# Patient Record
Sex: MALE | Race: WHITE | Employment: OTHER | URBAN - METROPOLITAN AREA
[De-identification: names, ages, dates, MRNs, and addresses within clinical notes are randomized per-mention and may not be internally consistent; named-entity substitution may affect disease eponyms.]

---

## 2018-11-05 ENCOUNTER — HOSPITAL ENCOUNTER (OUTPATIENT)
Dept: CT IMAGING | Age: 63
Discharge: HOME | End: 2018-11-05
Attending: ORTHOPAEDIC SURGERY
Payer: COMMERCIAL

## 2018-11-05 DIAGNOSIS — M16.11 PRIMARY OSTEOARTHRITIS OF RIGHT HIP: ICD-10-CM

## 2018-11-05 PROCEDURE — 73700 CT LOWER EXTREMITY W/O DYE: CPT

## 2018-11-21 ENCOUNTER — HOSPITAL ENCOUNTER (OUTPATIENT)
Dept: PREADMISSION TESTING | Age: 63
Discharge: HOME | End: 2018-11-21
Attending: ORTHOPAEDIC SURGERY
Payer: COMMERCIAL

## 2018-11-21 VITALS
OXYGEN SATURATION: 95 % | DIASTOLIC BLOOD PRESSURE: 78 MMHG | SYSTOLIC BLOOD PRESSURE: 134 MMHG | HEART RATE: 72 BPM | BODY MASS INDEX: 32.16 KG/M2 | RESPIRATION RATE: 16 BRPM | HEIGHT: 70 IN | WEIGHT: 224.65 LBS | TEMPERATURE: 98.2 F

## 2018-11-21 LAB
ABO + RH BLD: NORMAL
ALBUMIN SERPL-MCNC: 3.8 G/DL (ref 3.5–5)
ALBUMIN/GLOB SERPL: 1.2 {RATIO} (ref 1.1–2.2)
ALP SERPL-CCNC: 83 U/L (ref 45–117)
ALT SERPL-CCNC: 51 U/L (ref 12–78)
ANION GAP SERPL CALC-SCNC: 9 MMOL/L (ref 5–15)
APPEARANCE UR: CLEAR
AST SERPL-CCNC: 33 U/L (ref 15–37)
ATRIAL RATE: 67 BPM
BACTERIA URNS QL MICRO: NEGATIVE /HPF
BASOPHILS # BLD: 0.1 K/UL (ref 0–0.1)
BASOPHILS NFR BLD: 1 % (ref 0–1)
BILIRUB SERPL-MCNC: 0.6 MG/DL (ref 0.2–1)
BILIRUB UR QL CFM: NEGATIVE
BLOOD GROUP ANTIBODIES SERPL: NORMAL
BUN SERPL-MCNC: 13 MG/DL (ref 6–20)
BUN/CREAT SERPL: 10 (ref 12–20)
CALCIUM SERPL-MCNC: 8.4 MG/DL (ref 8.5–10.1)
CALCULATED P AXIS, ECG09: 66 DEGREES
CALCULATED R AXIS, ECG10: -22 DEGREES
CALCULATED T AXIS, ECG11: 22 DEGREES
CHLORIDE SERPL-SCNC: 104 MMOL/L (ref 97–108)
CO2 SERPL-SCNC: 29 MMOL/L (ref 21–32)
COLOR UR: NORMAL
CREAT SERPL-MCNC: 1.29 MG/DL (ref 0.7–1.3)
CRP SERPL-MCNC: <0.29 MG/DL
DIAGNOSIS, 93000: NORMAL
DIFFERENTIAL METHOD BLD: ABNORMAL
EOSINOPHIL # BLD: 0.1 K/UL (ref 0–0.4)
EOSINOPHIL NFR BLD: 1 % (ref 0–7)
EPITH CASTS URNS QL MICRO: NORMAL /LPF
ERYTHROCYTE [DISTWIDTH] IN BLOOD BY AUTOMATED COUNT: 12.9 % (ref 11.5–14.5)
ERYTHROCYTE [SEDIMENTATION RATE] IN BLOOD: 2 MM/HR (ref 0–20)
EST. AVERAGE GLUCOSE BLD GHB EST-MCNC: 114 MG/DL
GLOBULIN SER CALC-MCNC: 3.3 G/DL (ref 2–4)
GLUCOSE SERPL-MCNC: 92 MG/DL (ref 65–100)
GLUCOSE UR STRIP.AUTO-MCNC: NEGATIVE MG/DL
HBA1C MFR BLD: 5.6 % (ref 4.2–6.3)
HCT VFR BLD AUTO: 49 % (ref 36.6–50.3)
HGB BLD-MCNC: 16.3 G/DL (ref 12.1–17)
HGB UR QL STRIP: NEGATIVE
HYALINE CASTS URNS QL MICRO: NORMAL /LPF (ref 0–5)
IMM GRANULOCYTES # BLD: 0 K/UL (ref 0–0.04)
IMM GRANULOCYTES NFR BLD AUTO: 0 % (ref 0–0.5)
KETONES UR QL STRIP.AUTO: NEGATIVE MG/DL
LEUKOCYTE ESTERASE UR QL STRIP.AUTO: NEGATIVE
LYMPHOCYTES # BLD: 1.7 K/UL (ref 0.8–3.5)
LYMPHOCYTES NFR BLD: 32 % (ref 12–49)
MCH RBC QN AUTO: 32.7 PG (ref 26–34)
MCHC RBC AUTO-ENTMCNC: 33.3 G/DL (ref 30–36.5)
MCV RBC AUTO: 98.2 FL (ref 80–99)
MONOCYTES # BLD: 0.8 K/UL (ref 0–1)
MONOCYTES NFR BLD: 14 % (ref 5–13)
NEUTS SEG # BLD: 2.8 K/UL (ref 1.8–8)
NEUTS SEG NFR BLD: 52 % (ref 32–75)
NITRITE UR QL STRIP.AUTO: NEGATIVE
NRBC # BLD: 0 K/UL (ref 0–0.01)
NRBC BLD-RTO: 0 PER 100 WBC
P-R INTERVAL, ECG05: 126 MS
PH UR STRIP: 6.5 [PH] (ref 5–8)
PLATELET # BLD AUTO: 234 K/UL (ref 150–400)
PMV BLD AUTO: 9.4 FL (ref 8.9–12.9)
POTASSIUM SERPL-SCNC: 4.7 MMOL/L (ref 3.5–5.1)
PROT SERPL-MCNC: 7.1 G/DL (ref 6.4–8.2)
PROT UR STRIP-MCNC: NEGATIVE MG/DL
Q-T INTERVAL, ECG07: 390 MS
QRS DURATION, ECG06: 100 MS
QTC CALCULATION (BEZET), ECG08: 412 MS
RBC # BLD AUTO: 4.99 M/UL (ref 4.1–5.7)
RBC #/AREA URNS HPF: NORMAL /HPF (ref 0–5)
SODIUM SERPL-SCNC: 142 MMOL/L (ref 136–145)
SP GR UR REFRACTOMETRY: 1.02 (ref 1–1.03)
SPECIMEN EXP DATE BLD: NORMAL
UA: UC IF INDICATED,UAUC: NORMAL
UROBILINOGEN UR QL STRIP.AUTO: 1 EU/DL (ref 0.2–1)
VENTRICULAR RATE, ECG03: 67 BPM
WBC # BLD AUTO: 5.4 K/UL (ref 4.1–11.1)
WBC URNS QL MICRO: NORMAL /HPF (ref 0–4)

## 2018-11-21 PROCEDURE — 81001 URINALYSIS AUTO W/SCOPE: CPT

## 2018-11-21 PROCEDURE — 83036 HEMOGLOBIN GLYCOSYLATED A1C: CPT

## 2018-11-21 PROCEDURE — 93005 ELECTROCARDIOGRAM TRACING: CPT

## 2018-11-21 PROCEDURE — 36415 COLL VENOUS BLD VENIPUNCTURE: CPT

## 2018-11-21 PROCEDURE — 84466 ASSAY OF TRANSFERRIN: CPT

## 2018-11-21 PROCEDURE — 86140 C-REACTIVE PROTEIN: CPT

## 2018-11-21 PROCEDURE — 80053 COMPREHEN METABOLIC PANEL: CPT

## 2018-11-21 PROCEDURE — 86901 BLOOD TYPING SEROLOGIC RH(D): CPT

## 2018-11-21 PROCEDURE — 85652 RBC SED RATE AUTOMATED: CPT

## 2018-11-21 PROCEDURE — 85025 COMPLETE CBC W/AUTO DIFF WBC: CPT

## 2018-11-21 RX ORDER — FOLIC ACID 1 MG/1
TABLET ORAL DAILY
COMMUNITY

## 2018-11-21 RX ORDER — ETODOLAC 500 MG/1
500 TABLET, FILM COATED ORAL 2 TIMES DAILY
COMMUNITY

## 2018-11-21 RX ORDER — AMLODIPINE BESYLATE 5 MG/1
5 TABLET ORAL DAILY
COMMUNITY

## 2018-11-21 RX ORDER — TESTOSTERONE CYPIONATE 200 MG/ML
INJECTION INTRAMUSCULAR ONCE
COMMUNITY

## 2018-11-21 RX ORDER — PRAVASTATIN SODIUM 10 MG/1
TABLET ORAL
Status: ON HOLD | COMMUNITY
End: 2018-11-28

## 2018-11-21 RX ORDER — LISINOPRIL 5 MG/1
TABLET ORAL DAILY
COMMUNITY

## 2018-11-21 NOTE — FACE TO FACE
The patient attended the pre-operative joint replacement class. The content of the class was presented using a power point presentation and visual demonstrations specific for patients undergoing total hip and knee replacement surgery. A pre-operative education booklet was given to the patient. During class opportunities were given for questions to be asked and concerns voiced regarding the patients upcoming surgery. Patient and wife attended class on 11-21-18

## 2018-11-21 NOTE — PERIOP NOTES
[address] Pre-Admission Testing [phone] Pre-Operative Instructions Your procedure is scheduled for Wednesday, 11/28/18. We will call you the afternoon before surgery with your arrival time. If you have not received your arrival time by 7p.m., you may call us at [phone]. 
 
? Please report to the 2nd Floor Admitting Desk on the day of your surgery. Bring your insurance card, photo identification, and applicable copayment. ? If you are being admitted to the hospital, please leave your overnight bag in your car until you are taken to your hospital room. On the day you go home, expect to be discharged by noon. Please have your  arrive prior to noon so you are not delayed. ? You may not have anything to eat or drink after midnight the night before surgery. ? With as little water as possible, take these medications the morning of surgery:  amlodipine ? Do not drink alcoholic beverages 24 hours before and after your surgery. ? Do not take methotrexate until after surgery. Please follow surgeon's instructions regarding biologic. ? Do not take aspirin, etodolac, methotrexate, or any other non-steroidal anti-inflammatory drug beginning today, 11/21/18, until after your surgery. This includes Ibuprofen, Motrin, Advil, Naproxen, & Aleve. You may resume post-operatively as directed by your surgeon. ? You may take Tylenol 650mg for pain or discomfort. ? If you shower the morning of surgery, please do not apply lotion, powder, deodorant. Do not shave any part of your body within 24 hours of surgery. ? Please leave all money, jewelry (including piercings) and valuables at home. ? Wear comfortable clothes. ? Should you become ill in the days before your surgery, even with a simple cold, please notify your surgeons office immediately. ? Please follow all instructions to avoid any potential surgical cancellation. ? If a situation occurs where you may be delayed the day of your surgery, please call us at [phone]. Special Instructions: 
Use Chlorhexidine Care Fusion wash and sponges 3 days prior to surgery as instructed. Incentive spirometer given with instructions to practice at home and bring back to the hospital on the day of surgery. [facility] will contact you if your Hemoglobin A1C is greater than 7.5. Ensure/Glucerna  sample, nutritional information, and Ensure/Glucerna coupon given. Pain pamphlet and Call Don't Fall reminder reviewed with patient.  parking is complimentary Monday - Friday 7 am - 5 pm 
Bring PTA Medication list day of surgery with the last doses taken documented ? The patient was contacted in person. He verbalizes understanding of all instructions does not need reinforcement.

## 2018-11-21 NOTE — H&P
PAT Pre-Op History & Physical 
 
Patient: Liz Anderson                  MRN: [id number]          SSN: [id number] YOB: 1955          Age: 66 y.o. Sex: male Subjective:  
Patient is a 66 y.o.  male who presents with history of chronic right hip pain that has been a problem for 10-12 years per patient report. States that the pain has escalated over time. Rates his right hip pain 2/10- 8/10 and describes it as a constant burning pain that can be sharp at times. States that the pain is in his right groin and radiates into the outer aspect of his hip. Walking exacerbates his hip pain and therefore he can not be as active as he would like. Ct of right LE done 11/05/2018 showed: 
 
IMPRESSION: Severe right and mild left hip osteoarthrosis. Mild bilateral SI joint osteoarthrosis, greater on the right. Lower lumbar spine degenerative 
changes with moderate L4-5 canal stenosis. Has failed steroid joint injections, NSAIDs, and PT. The patient was evaluated in the surgeon's office and it was determined that the most appropriate plan of care is to proceed with surgical intervention. [facility]'s PCP [facility] 
 
 
 
 
 
Past Medical History:  
Diagnosis Date  Arthritis   
 ankylosing spondylitis  Asthma   
 childhood- no problem after age 17  Cancer ([address] Skin cancer x3 on head (BCC and SCC)  Chronic pain   
 right hip, sciatica  Hyperlipidemia  Hypertension  Obesity (BMI 30.0-34.9) 11/21/2018 BMI= 32.2  Pericarditis, acute 1976  Estella's disease ([address] Past Surgical History:  
Procedure Laterality Date  HX ORTHOPAEDIC Right 2003  
 bone fusion thumb  HX OTHER SURGICAL    
 3 surgeries to remove skin cancer (Mohs) Prior to Admission medications Medication Sig Start Date End Date Taking? Authorizing Provider  
etodolac (LODINE) 500 mg tablet Take 500 mg by mouth two (2) times a day.    Yes Provider, Historical  
 lisinopril (PRINIVIL, ZESTRIL) 5 mg tablet Take  by mouth daily. Yes Provider, Historical  
amLODIPine (NORVASC) 5 mg tablet Take 5 mg by mouth daily. Yes Provider, Historical  
pravastatin (PRAVACHOL) 10 mg tablet Take  by mouth nightly. Yes Provider, Historical  
folic acid (FOLVITE) 1 mg tablet Take  by mouth daily. Yes Provider, Historical  
methotrexate (RHEUMATREX) 2.5 mg tablet dose pack Take 15 mg by mouth every . Yes Provider, Historical  
infliximab (REMICADE IV) 476 mg by IntraVENous route every six (6) weeks. Yes Provider, Historical  
testosterone cypionate (DEPO-TESTOSTERONE) 200 mg/mL injection by IntraMUSCular route once. Pt states he gets injection once every 10wks   Yes Provider, Historical  
 
Current Outpatient Medications Medication Sig  etodolac (LODINE) 500 mg tablet Take 500 mg by mouth two (2) times a day.  lisinopril (PRINIVIL, ZESTRIL) 5 mg tablet Take  by mouth daily.  amLODIPine (NORVASC) 5 mg tablet Take 5 mg by mouth daily.  pravastatin (PRAVACHOL) 10 mg tablet Take  by mouth nightly.  folic acid (FOLVITE) 1 mg tablet Take  by mouth daily.  methotrexate (RHEUMATREX) 2.5 mg tablet dose pack Take 15 mg by mouth every .  infliximab (REMICADE IV) 476 mg by IntraVENous route every six (6) weeks.  testosterone cypionate (DEPO-TESTOSTERONE) 200 mg/mL injection by IntraMUSCular route once. Pt states he gets injection once every 10wks No current facility-administered medications for this encounter. Allergies Allergen Reactions  Pcn [Penicillins] Hives  Tramadol Hives Social History Tobacco Use  Smoking status: Former Smoker Packs/day: 0.75 Years: 18.00 Pack years: 13.50 Last attempt to quit: 1993 Years since quittin.0  Smokeless tobacco: Never Used Substance Use Topics  Alcohol use: Yes Alcohol/week: 1.2 oz Types: 2 Shots of liquor per week Social History Substance and Sexual Activity Drug Use No  
 
Family History Family history unknown: Yes Review of Systems Patient denies difficulty swallowing, mouth sores, or loose teeth. Patient denies any recent dental procedures or any planned prior to surgery. Patient denies chest pain, tightness, pain radiating down left arm, palpitations. Denies dizziness, visual disturbances, or lightheadedness. Patient denies shortness of breath, wheezing, cough, fever, or chills. Patient denies diarrhea, constipation, or abdominal pain. States he normally has BM every 5 days. Patient denies urinary problems including dysuria, hesitancy, urgency, or incontinence. Denies skin breakdown, rashes, insect bites or open area. C/o right hip pain. Objective:  
 
Patient Vitals for the past 24 hrs: 
 Temp Pulse Resp BP SpO2  
18 0905 98.2 °F (36.8 °C) 72 16 134/78 95 % Temp (24hrs), Av.2 °F (36.8 °C), Min:98.2 °F (36.8 °C), Max:98.2 °F (36.8 °C) Body mass index is 32.23 kg/m². Wt Readings from Last 1 Encounters:  
18 101.9 kg (224 lb 10.4 oz) Physical Exam: 
 
 General: Pleasant,  cooperative, no apparent distress, appears stated age. Ambulates with a cane. Eyes: Conjunctivae/corneas clear. EOMs intact. Nose: Nares normal. 
 Mouth/Throat: Lips, mucosa, and tongue normal. Teeth and gums normal. 
 Neck: Supple, symmetrical, trachea midline. Back: Symmetric Lungs: Clear to auscultation bilaterally. Heart: Regular rate and rhythm, S1, S2 normal. No murmur, click, rub or gallop. Abdomen: Soft, non-tender. Bowel sounds normal. No distention. Musculoskeletal:  Gait is antalgic. Extremities:  Extremities normal, atraumatic, no cyanosis or edema. Calves 
                               supple, non tender to palpation. Pulses: 2+ and symmetric bilateral upper extremities. Cap. refill <2 seconds Skin: Skin color, texture, turgor normal.  No visible rashes or lesions. Neurologic: CN II-XII grossly intact. Alert and oriented x3. Labs:  
Recent Results (from the past 72 hour(s)) TYPE & SCREEN Collection Time: 11/21/18  9:47 AM  
Result Value Ref Range Crossmatch Expiration 12/01/2018 ABO/Rh(D) A POSITIVE Antibody screen NEG   
C REACTIVE PROTEIN, QT Collection Time: 11/21/18  9:47 AM  
Result Value Ref Range C-Reactive protein <0.29 <0.60 mg/dL CBC WITH AUTOMATED DIFF Collection Time: 11/21/18  9:47 AM  
Result Value Ref Range WBC 5.4 4.1 - 11.1 K/uL  
 RBC 4.99 4. 10 - 5.70 M/uL  
 HGB 16.3 12.1 - 17.0 g/dL HCT 49.0 36.6 - 50.3 % MCV 98.2 80.0 - 99.0 FL  
 MCH 32.7 26.0 - 34.0 PG  
 MCHC 33.3 30.0 - 36.5 g/dL  
 RDW 12.9 11.5 - 14.5 % PLATELET 197 532 - 355 K/uL MPV 9.4 8.9 - 12.9 FL  
 NRBC 0.0 0  WBC ABSOLUTE NRBC 0.00 0.00 - 0.01 K/uL NEUTROPHILS 52 32 - 75 % LYMPHOCYTES 32 12 - 49 % MONOCYTES 14 (H) 5 - 13 % EOSINOPHILS 1 0 - 7 % BASOPHILS 1 0 - 1 % IMMATURE GRANULOCYTES 0 0.0 - 0.5 % ABS. NEUTROPHILS 2.8 1.8 - 8.0 K/UL  
 ABS. LYMPHOCYTES 1.7 0.8 - 3.5 K/UL  
 ABS. MONOCYTES 0.8 0.0 - 1.0 K/UL  
 ABS. EOSINOPHILS 0.1 0.0 - 0.4 K/UL  
 ABS. BASOPHILS 0.1 0.0 - 0.1 K/UL  
 ABS. IMM. GRANS. 0.0 0.00 - 0.04 K/UL  
 DF AUTOMATED METABOLIC PANEL, COMPREHENSIVE Collection Time: 11/21/18  9:47 AM  
Result Value Ref Range Sodium 142 136 - 145 mmol/L Potassium 4.7 3.5 - 5.1 mmol/L Chloride 104 97 - 108 mmol/L  
 CO2 29 21 - 32 mmol/L Anion gap 9 5 - 15 mmol/L Glucose 92 65 - 100 mg/dL BUN 13 6 - 20 MG/DL Creatinine 1.29 0.70 - 1.30 MG/DL  
 BUN/Creatinine ratio 10 (L) 12 - 20 GFR est AA >60 >60 ml/min/1.73m2 GFR est non-AA 57 (L) >60 ml/min/1.73m2 Calcium 8.4 (L) 8.5 - 10.1 MG/DL Bilirubin, total 0.6 0.2 - 1.0 MG/DL  
 ALT (SGPT) 51 12 - 78 U/L  
 AST (SGOT) 33 15 - 37 U/L Alk. phosphatase 83 45 - 117 U/L Protein, total 7.1 6.4 - 8.2 g/dL Albumin 3.8 3.5 - 5.0 g/dL Globulin 3.3 2.0 - 4.0 g/dL A-G Ratio 1.2 1.1 - 2.2 HEMOGLOBIN A1C WITH EAG Collection Time: 11/21/18  9:47 AM  
Result Value Ref Range Hemoglobin A1c 5.6 4.2 - 6.3 % Est. average glucose 114 mg/dL SED RATE (ESR) Collection Time: 11/21/18  9:47 AM  
Result Value Ref Range Sed rate, automated 2 0 - 20 mm/hr URINALYSIS W/ REFLEX CULTURE Collection Time: 11/21/18  9:47 AM  
Result Value Ref Range Color YELLOW/STRAW Appearance CLEAR CLEAR Specific gravity 1.022 1.003 - 1.030    
 pH (UA) 6.5 5.0 - 8.0 Protein NEGATIVE  NEG mg/dL Glucose NEGATIVE  NEG mg/dL Ketone NEGATIVE  NEG mg/dL Blood NEGATIVE  NEG Urobilinogen 1.0 0.2 - 1.0 EU/dL Nitrites NEGATIVE  NEG Leukocyte Esterase NEGATIVE  NEG    
 WBC 0-4 0 - 4 /hpf  
 RBC 10-20 0 - 5 /hpf Epithelial cells FEW FEW /lpf Bacteria NEGATIVE  NEG /hpf  
 UA:UC IF INDICATED CULTURE NOT INDICATED BY UA RESULT CNI Hyaline cast 0-2 0 - 5 /lpf  
BILIRUBIN, CONFIRM Collection Time: 11/21/18  9:47 AM  
Result Value Ref Range Bilirubin UA, confirm NEGATIVE  NEG    
EKG, 12 LEAD, INITIAL Collection Time: 11/21/18 12:23 PM  
Result Value Ref Range Ventricular Rate 67 BPM  
 Atrial Rate 67 BPM  
 P-R Interval 126 ms  
 QRS Duration 100 ms Q-T Interval 390 ms QTC Calculation (Bezet) 412 ms Calculated P Axis 66 degrees Calculated R Axis -22 degrees Calculated T Axis 22 degrees Diagnosis Normal sinus rhythm Normal ECG No previous ECGs available Assessment:  
 
Primary localized osteoarthritis of right hip. Plan:  
 
Scheduled for right total hip arthroplasty- direct anterior. Labs and EKG done per surgeon's orders. Labs and EKG reviewed- unremarkable. MRSA and transferrin pending. Attended joint class 11/21/2018. Requested last office note from rheumatology- [address] [place] [address].  
 
 
 
Pepper Garcia NP

## 2018-11-21 NOTE — PERIOP NOTES
Called Dr. Adrian Gutierrez office requesting last office note, labs, & date of last remicade injection.

## 2018-11-22 LAB
BACTERIA SPEC CULT: NORMAL
BACTERIA SPEC CULT: NORMAL
SERVICE CMNT-IMP: NORMAL
TRANSFERRIN SERPL-MCNC: 296 MG/DL (ref 200–370)

## 2018-11-27 ENCOUNTER — ANESTHESIA EVENT (OUTPATIENT)
Dept: SURGERY | Age: 63
DRG: 470 | End: 2018-11-27
Payer: COMMERCIAL

## 2018-11-28 ENCOUNTER — ANESTHESIA (OUTPATIENT)
Dept: SURGERY | Age: 63
DRG: 470 | End: 2018-11-28
Payer: COMMERCIAL

## 2018-11-28 ENCOUNTER — APPOINTMENT (OUTPATIENT)
Dept: GENERAL RADIOLOGY | Age: 63
DRG: 470 | End: 2018-11-28
Attending: PHYSICIAN ASSISTANT
Payer: COMMERCIAL

## 2018-11-28 ENCOUNTER — HOSPITAL ENCOUNTER (INPATIENT)
Age: 63
LOS: 1 days | Discharge: HOME | DRG: 470 | End: 2018-11-29
Attending: ORTHOPAEDIC SURGERY | Admitting: ORTHOPAEDIC SURGERY
Payer: COMMERCIAL

## 2018-11-28 DIAGNOSIS — M16.11 PRIMARY OSTEOARTHRITIS OF RIGHT HIP: Primary | ICD-10-CM

## 2018-11-28 PROCEDURE — 77030013708 HC HNDPC SUC IRR PULS STRY –B: Performed by: ORTHOPAEDIC SURGERY

## 2018-11-28 PROCEDURE — 74011000250 HC RX REV CODE- 250

## 2018-11-28 PROCEDURE — 72170 X-RAY EXAM OF PELVIS: CPT

## 2018-11-28 PROCEDURE — C1713 ANCHOR/SCREW BN/BN,TIS/BN: HCPCS | Performed by: ORTHOPAEDIC SURGERY

## 2018-11-28 PROCEDURE — 97116 GAIT TRAINING THERAPY: CPT

## 2018-11-28 PROCEDURE — 0SR904A REPLACEMENT OF RIGHT HIP JOINT WITH CERAMIC ON POLYETHYLENE SYNTHETIC SUBSTITUTE, UNCEMENTED, OPEN APPROACH: ICD-10-PCS | Performed by: ORTHOPAEDIC SURGERY

## 2018-11-28 PROCEDURE — 77030002933 HC SUT MCRYL J&J -A: Performed by: ORTHOPAEDIC SURGERY

## 2018-11-28 PROCEDURE — 74011250637 HC RX REV CODE- 250/637: Performed by: ANESTHESIOLOGY

## 2018-11-28 PROCEDURE — 77030020788: Performed by: ORTHOPAEDIC SURGERY

## 2018-11-28 PROCEDURE — 77030039266 HC ADH SKN EXOFIN S2SG -A: Performed by: ORTHOPAEDIC SURGERY

## 2018-11-28 PROCEDURE — 77030006835 HC BLD SAW SAG STRY -B: Performed by: ORTHOPAEDIC SURGERY

## 2018-11-28 PROCEDURE — 77030032490 HC SLV COMPR SCD KNE COVD -B

## 2018-11-28 PROCEDURE — 74011250637 HC RX REV CODE- 250/637: Performed by: ORTHOPAEDIC SURGERY

## 2018-11-28 PROCEDURE — 76060000064 HC AMB SURG ANES 2 TO 2.5 HR: Performed by: ORTHOPAEDIC SURGERY

## 2018-11-28 PROCEDURE — 77030029821: Performed by: ORTHOPAEDIC SURGERY

## 2018-11-28 PROCEDURE — 77030020263 HC SOL INJ SOD CL0.9% LFCR 1000ML: Performed by: ORTHOPAEDIC SURGERY

## 2018-11-28 PROCEDURE — 77030035236 HC SUT PDS STRATFX BARB J&J -B: Performed by: ORTHOPAEDIC SURGERY

## 2018-11-28 PROCEDURE — 65270000029 HC RM PRIVATE

## 2018-11-28 PROCEDURE — 77030029829 HC TIB INST CKPNT DISP STRY -B: Performed by: ORTHOPAEDIC SURGERY

## 2018-11-28 PROCEDURE — 77030018673: Performed by: ORTHOPAEDIC SURGERY

## 2018-11-28 PROCEDURE — 74011000258 HC RX REV CODE- 258

## 2018-11-28 PROCEDURE — 74011250636 HC RX REV CODE- 250/636: Performed by: ANESTHESIOLOGY

## 2018-11-28 PROCEDURE — 76210000036 HC AMBSU PH I REC 1.5 TO 2 HR: Performed by: ORTHOPAEDIC SURGERY

## 2018-11-28 PROCEDURE — 74011250636 HC RX REV CODE- 250/636

## 2018-11-28 PROCEDURE — 77030031139 HC SUT VCRL2 J&J -A: Performed by: ORTHOPAEDIC SURGERY

## 2018-11-28 PROCEDURE — 77030018836 HC SOL IRR NACL ICUM -A: Performed by: ORTHOPAEDIC SURGERY

## 2018-11-28 PROCEDURE — 77030007866 HC KT SPN ANES BBMI -B

## 2018-11-28 PROCEDURE — 74011000272 HC RX REV CODE- 272: Performed by: ORTHOPAEDIC SURGERY

## 2018-11-28 PROCEDURE — 77030036660

## 2018-11-28 PROCEDURE — 76030000021 HC AMB SURG 2 TO 2.5 HR INTENSV-TIER 1: Performed by: ORTHOPAEDIC SURGERY

## 2018-11-28 PROCEDURE — 77030020782 HC GWN BAIR PAWS FLX 3M -B

## 2018-11-28 PROCEDURE — 77030012935 HC DRSG AQUACEL BMS -B: Performed by: ORTHOPAEDIC SURGERY

## 2018-11-28 PROCEDURE — 77030038587 HC LNR BOOT DISP ALLN -B: Performed by: ORTHOPAEDIC SURGERY

## 2018-11-28 PROCEDURE — C1776 JOINT DEVICE (IMPLANTABLE): HCPCS | Performed by: ORTHOPAEDIC SURGERY

## 2018-11-28 PROCEDURE — 74011000250 HC RX REV CODE- 250: Performed by: ORTHOPAEDIC SURGERY

## 2018-11-28 PROCEDURE — 77030011640 HC PAD GRND REM COVD -A: Performed by: ORTHOPAEDIC SURGERY

## 2018-11-28 PROCEDURE — 97161 PT EVAL LOW COMPLEX 20 MIN: CPT

## 2018-11-28 PROCEDURE — 74011250637 HC RX REV CODE- 250/637: Performed by: PHYSICIAN ASSISTANT

## 2018-11-28 PROCEDURE — 74011250636 HC RX REV CODE- 250/636: Performed by: PHYSICIAN ASSISTANT

## 2018-11-28 PROCEDURE — 74011250636 HC RX REV CODE- 250/636: Performed by: ORTHOPAEDIC SURGERY

## 2018-11-28 DEVICE — COMPONENT HIP PRSS FT MTL ON CERM POLYETH X3: Type: IMPLANTABLE DEVICE | Status: FUNCTIONAL

## 2018-11-28 DEVICE — CERAMIC V40 FEMORAL HEAD
Type: IMPLANTABLE DEVICE | Site: HIP | Status: FUNCTIONAL
Brand: BIOLOX

## 2018-11-28 DEVICE — CANCELLOUS BONE SCREW
Type: IMPLANTABLE DEVICE | Site: HIP | Status: FUNCTIONAL
Brand: TORX

## 2018-11-28 DEVICE — HEMISPHERICAL CLUSTER HOLE SHELL
Type: IMPLANTABLE DEVICE | Site: HIP | Status: FUNCTIONAL
Brand: TRITANIUM

## 2018-11-28 DEVICE — 0 DEGREE POLYETHYLENE INSERT
Type: IMPLANTABLE DEVICE | Site: HIP | Status: FUNCTIONAL
Brand: TRIDENT

## 2018-11-28 DEVICE — 127 DEGREE NECK ANGLE HIP STEM
Type: IMPLANTABLE DEVICE | Site: HIP | Status: FUNCTIONAL
Brand: ACCOLADE

## 2018-11-28 RX ORDER — ONDANSETRON 2 MG/ML
4 INJECTION INTRAMUSCULAR; INTRAVENOUS
Status: ACTIVE | OUTPATIENT
Start: 2018-11-28 | End: 2018-11-29

## 2018-11-28 RX ORDER — OXYCODONE HYDROCHLORIDE 5 MG/1
10 TABLET ORAL
Status: DISCONTINUED | OUTPATIENT
Start: 2018-11-28 | End: 2018-11-29 | Stop reason: HOSPADM

## 2018-11-28 RX ORDER — FENTANYL CITRATE 50 UG/ML
INJECTION, SOLUTION INTRAMUSCULAR; INTRAVENOUS AS NEEDED
Status: DISCONTINUED | OUTPATIENT
Start: 2018-11-28 | End: 2018-11-28 | Stop reason: HOSPADM

## 2018-11-28 RX ORDER — OXYCODONE HYDROCHLORIDE 5 MG/1
5 TABLET ORAL
Status: DISCONTINUED | OUTPATIENT
Start: 2018-11-28 | End: 2018-11-29 | Stop reason: HOSPADM

## 2018-11-28 RX ORDER — TRAMADOL HYDROCHLORIDE 50 MG/1
50 TABLET ORAL
Qty: 40 TAB | Refills: 0 | Status: SHIPPED | OUTPATIENT
Start: 2018-11-28

## 2018-11-28 RX ORDER — AMOXICILLIN 250 MG
1 CAPSULE ORAL 2 TIMES DAILY
Status: DISCONTINUED | OUTPATIENT
Start: 2018-11-28 | End: 2018-11-29 | Stop reason: HOSPADM

## 2018-11-28 RX ORDER — SODIUM CHLORIDE, SODIUM LACTATE, POTASSIUM CHLORIDE, CALCIUM CHLORIDE 600; 310; 30; 20 MG/100ML; MG/100ML; MG/100ML; MG/100ML
125 INJECTION, SOLUTION INTRAVENOUS CONTINUOUS
Status: DISCONTINUED | OUTPATIENT
Start: 2018-11-28 | End: 2018-11-28 | Stop reason: HOSPADM

## 2018-11-28 RX ORDER — NALOXONE HYDROCHLORIDE 0.4 MG/ML
0.4 INJECTION, SOLUTION INTRAMUSCULAR; INTRAVENOUS; SUBCUTANEOUS AS NEEDED
Status: DISCONTINUED | OUTPATIENT
Start: 2018-11-28 | End: 2018-11-29 | Stop reason: HOSPADM

## 2018-11-28 RX ORDER — FACIAL-BODY WIPES
10 EACH TOPICAL DAILY PRN
Status: DISCONTINUED | OUTPATIENT
Start: 2018-11-30 | End: 2018-11-29 | Stop reason: HOSPADM

## 2018-11-28 RX ORDER — LIDOCAINE HYDROCHLORIDE 20 MG/ML
INJECTION, SOLUTION INFILTRATION; PERINEURAL AS NEEDED
Status: DISCONTINUED | OUTPATIENT
Start: 2018-11-28 | End: 2018-11-28 | Stop reason: HOSPADM

## 2018-11-28 RX ORDER — LIDOCAINE HYDROCHLORIDE 10 MG/ML
0.1 INJECTION, SOLUTION EPIDURAL; INFILTRATION; INTRACAUDAL; PERINEURAL AS NEEDED
Status: DISCONTINUED | OUTPATIENT
Start: 2018-11-28 | End: 2018-11-28 | Stop reason: HOSPADM

## 2018-11-28 RX ORDER — SODIUM CHLORIDE 0.9 % (FLUSH) 0.9 %
5-10 SYRINGE (ML) INJECTION EVERY 8 HOURS
Status: DISCONTINUED | OUTPATIENT
Start: 2018-11-29 | End: 2018-11-29 | Stop reason: HOSPADM

## 2018-11-28 RX ORDER — DIPHENHYDRAMINE HYDROCHLORIDE 50 MG/ML
12.5 INJECTION, SOLUTION INTRAMUSCULAR; INTRAVENOUS
Status: ACTIVE | OUTPATIENT
Start: 2018-11-28 | End: 2018-11-29

## 2018-11-28 RX ORDER — OXYCODONE HYDROCHLORIDE 5 MG/1
5 TABLET ORAL
Qty: 30 TAB | Refills: 0 | Status: SHIPPED | OUTPATIENT
Start: 2018-11-28

## 2018-11-28 RX ORDER — ONDANSETRON 2 MG/ML
INJECTION INTRAMUSCULAR; INTRAVENOUS AS NEEDED
Status: DISCONTINUED | OUTPATIENT
Start: 2018-11-28 | End: 2018-11-28 | Stop reason: HOSPADM

## 2018-11-28 RX ORDER — POLYETHYLENE GLYCOL 3350 17 G/17G
17 POWDER, FOR SOLUTION ORAL DAILY
Status: DISCONTINUED | OUTPATIENT
Start: 2018-11-29 | End: 2018-11-29 | Stop reason: HOSPADM

## 2018-11-28 RX ORDER — ACETAMINOPHEN 325 MG/1
650 TABLET ORAL EVERY 6 HOURS
Status: DISCONTINUED | OUTPATIENT
Start: 2018-11-28 | End: 2018-11-29 | Stop reason: HOSPADM

## 2018-11-28 RX ORDER — FAMOTIDINE 20 MG/1
20 TABLET, FILM COATED ORAL 2 TIMES DAILY
Status: DISCONTINUED | OUTPATIENT
Start: 2018-11-28 | End: 2018-11-29 | Stop reason: HOSPADM

## 2018-11-28 RX ORDER — PREGABALIN 75 MG/1
75 CAPSULE ORAL ONCE
Status: COMPLETED | OUTPATIENT
Start: 2018-11-28 | End: 2018-11-28

## 2018-11-28 RX ORDER — AMLODIPINE BESYLATE 5 MG/1
5 TABLET ORAL DAILY
Status: DISCONTINUED | OUTPATIENT
Start: 2018-11-29 | End: 2018-11-29 | Stop reason: HOSPADM

## 2018-11-28 RX ORDER — SODIUM CHLORIDE 9 MG/ML
125 INJECTION, SOLUTION INTRAVENOUS CONTINUOUS
Status: DISCONTINUED | OUTPATIENT
Start: 2018-11-28 | End: 2018-11-29 | Stop reason: HOSPADM

## 2018-11-28 RX ORDER — PRAVASTATIN SODIUM 20 MG/1
40 TABLET ORAL
Status: DISCONTINUED | OUTPATIENT
Start: 2018-11-28 | End: 2018-11-29 | Stop reason: HOSPADM

## 2018-11-28 RX ORDER — ACETAMINOPHEN 500 MG
500-1000 TABLET ORAL
Qty: 60 TAB | Refills: 0 | Status: SHIPPED | OUTPATIENT
Start: 2018-11-28

## 2018-11-28 RX ORDER — ASPIRIN 81 MG/1
81 TABLET ORAL 2 TIMES DAILY
Status: DISCONTINUED | OUTPATIENT
Start: 2018-11-28 | End: 2018-11-29 | Stop reason: HOSPADM

## 2018-11-28 RX ORDER — FENTANYL CITRATE 50 UG/ML
25 INJECTION, SOLUTION INTRAMUSCULAR; INTRAVENOUS
Status: DISCONTINUED | OUTPATIENT
Start: 2018-11-28 | End: 2018-11-28 | Stop reason: HOSPADM

## 2018-11-28 RX ORDER — CEFAZOLIN SODIUM/WATER 2 G/20 ML
2 SYRINGE (ML) INTRAVENOUS ONCE
Status: DISCONTINUED | OUTPATIENT
Start: 2018-11-28 | End: 2018-11-28

## 2018-11-28 RX ORDER — MIDAZOLAM HYDROCHLORIDE 1 MG/ML
INJECTION, SOLUTION INTRAMUSCULAR; INTRAVENOUS AS NEEDED
Status: DISCONTINUED | OUTPATIENT
Start: 2018-11-28 | End: 2018-11-28 | Stop reason: HOSPADM

## 2018-11-28 RX ORDER — ONDANSETRON 8 MG/1
4 TABLET, ORALLY DISINTEGRATING ORAL
Qty: 30 TAB | Refills: 0 | Status: SHIPPED | OUTPATIENT
Start: 2018-11-28

## 2018-11-28 RX ORDER — SODIUM CHLORIDE 0.9 % (FLUSH) 0.9 %
5-10 SYRINGE (ML) INJECTION AS NEEDED
Status: DISCONTINUED | OUTPATIENT
Start: 2018-11-28 | End: 2018-11-29 | Stop reason: HOSPADM

## 2018-11-28 RX ORDER — DEXAMETHASONE SODIUM PHOSPHATE 4 MG/ML
INJECTION, SOLUTION INTRA-ARTICULAR; INTRALESIONAL; INTRAMUSCULAR; INTRAVENOUS; SOFT TISSUE AS NEEDED
Status: DISCONTINUED | OUTPATIENT
Start: 2018-11-28 | End: 2018-11-28 | Stop reason: HOSPADM

## 2018-11-28 RX ORDER — BUPIVACAINE HYDROCHLORIDE 5 MG/ML
INJECTION, SOLUTION EPIDURAL; INTRACAUDAL AS NEEDED
Status: DISCONTINUED | OUTPATIENT
Start: 2018-11-28 | End: 2018-11-28 | Stop reason: HOSPADM

## 2018-11-28 RX ORDER — OXYCODONE HYDROCHLORIDE 5 MG/1
10 TABLET ORAL
Status: DISCONTINUED | OUTPATIENT
Start: 2018-11-28 | End: 2018-11-28 | Stop reason: HOSPADM

## 2018-11-28 RX ORDER — DIPHENHYDRAMINE HYDROCHLORIDE 50 MG/ML
12.5 INJECTION, SOLUTION INTRAMUSCULAR; INTRAVENOUS AS NEEDED
Status: DISCONTINUED | OUTPATIENT
Start: 2018-11-28 | End: 2018-11-28 | Stop reason: HOSPADM

## 2018-11-28 RX ORDER — ACETAMINOPHEN 325 MG/1
975 TABLET ORAL ONCE
Status: COMPLETED | OUTPATIENT
Start: 2018-11-28 | End: 2018-11-28

## 2018-11-28 RX ORDER — PROPOFOL 10 MG/ML
VIAL (ML) INTRAVENOUS
Status: DISCONTINUED | OUTPATIENT
Start: 2018-11-28 | End: 2018-11-28 | Stop reason: HOSPADM

## 2018-11-28 RX ORDER — POVIDONE-IODINE 10 %
SOLUTION, NON-ORAL TOPICAL AS NEEDED
Status: DISCONTINUED | OUTPATIENT
Start: 2018-11-28 | End: 2018-11-28 | Stop reason: HOSPADM

## 2018-11-28 RX ORDER — PRAVASTATIN SODIUM 40 MG/1
40 TABLET ORAL
COMMUNITY

## 2018-11-28 RX ORDER — CELECOXIB 100 MG/1
200 CAPSULE ORAL 2 TIMES DAILY
Status: DISCONTINUED | OUTPATIENT
Start: 2018-11-29 | End: 2018-11-29 | Stop reason: HOSPADM

## 2018-11-28 RX ORDER — FLUMAZENIL 0.1 MG/ML
0.2 INJECTION INTRAVENOUS
Status: DISCONTINUED | OUTPATIENT
Start: 2018-11-28 | End: 2018-11-28 | Stop reason: HOSPADM

## 2018-11-28 RX ORDER — SODIUM CHLORIDE 0.9 % (FLUSH) 0.9 %
5-10 SYRINGE (ML) INJECTION EVERY 8 HOURS
Status: DISCONTINUED | OUTPATIENT
Start: 2018-11-28 | End: 2018-11-28 | Stop reason: HOSPADM

## 2018-11-28 RX ORDER — HYDROMORPHONE HYDROCHLORIDE 2 MG/ML
0.5 INJECTION, SOLUTION INTRAMUSCULAR; INTRAVENOUS; SUBCUTANEOUS
Status: ACTIVE | OUTPATIENT
Start: 2018-11-28 | End: 2018-11-29

## 2018-11-28 RX ORDER — SODIUM CHLORIDE 0.9 % (FLUSH) 0.9 %
5-10 SYRINGE (ML) INJECTION AS NEEDED
Status: DISCONTINUED | OUTPATIENT
Start: 2018-11-28 | End: 2018-11-28 | Stop reason: HOSPADM

## 2018-11-28 RX ORDER — NALOXONE HYDROCHLORIDE 0.4 MG/ML
0.2 INJECTION, SOLUTION INTRAMUSCULAR; INTRAVENOUS; SUBCUTANEOUS
Status: DISCONTINUED | OUTPATIENT
Start: 2018-11-28 | End: 2018-11-28 | Stop reason: HOSPADM

## 2018-11-28 RX ORDER — ASPIRIN 81 MG/1
81 TABLET ORAL 2 TIMES DAILY
Qty: 60 TAB | Refills: 0 | Status: SHIPPED | OUTPATIENT
Start: 2018-11-28

## 2018-11-28 RX ORDER — CEFAZOLIN SODIUM/WATER 2 G/20 ML
2 SYRINGE (ML) INTRAVENOUS EVERY 8 HOURS
Status: COMPLETED | OUTPATIENT
Start: 2018-11-28 | End: 2018-11-29

## 2018-11-28 RX ORDER — HYDROMORPHONE HYDROCHLORIDE 1 MG/ML
.25-1 INJECTION, SOLUTION INTRAMUSCULAR; INTRAVENOUS; SUBCUTANEOUS
Status: DISCONTINUED | OUTPATIENT
Start: 2018-11-28 | End: 2018-11-28 | Stop reason: HOSPADM

## 2018-11-28 RX ORDER — IBUPROFEN 800 MG/1
800 TABLET ORAL
Qty: 50 TAB | Refills: 2 | Status: SHIPPED | OUTPATIENT
Start: 2018-11-28

## 2018-11-28 RX ORDER — OXYCODONE HCL 10 MG/1
10 TABLET, FILM COATED, EXTENDED RELEASE ORAL ONCE
Status: COMPLETED | OUTPATIENT
Start: 2018-11-28 | End: 2018-11-28

## 2018-11-28 RX ORDER — FOLIC ACID 1 MG/1
1 TABLET ORAL DAILY
Status: DISCONTINUED | OUTPATIENT
Start: 2018-11-29 | End: 2018-11-29

## 2018-11-28 RX ORDER — KETOROLAC TROMETHAMINE 30 MG/ML
30 INJECTION, SOLUTION INTRAMUSCULAR; INTRAVENOUS EVERY 6 HOURS
Status: COMPLETED | OUTPATIENT
Start: 2018-11-28 | End: 2018-11-29

## 2018-11-28 RX ORDER — GABAPENTIN 100 MG/1
100 CAPSULE ORAL
Qty: 120 CAP | Refills: 1 | Status: SHIPPED | OUTPATIENT
Start: 2018-11-28

## 2018-11-28 RX ADMIN — MIDAZOLAM HYDROCHLORIDE 2 MG: 1 INJECTION, SOLUTION INTRAMUSCULAR; INTRAVENOUS at 12:03

## 2018-11-28 RX ADMIN — SODIUM CHLORIDE, POTASSIUM CHLORIDE, SODIUM LACTATE AND CALCIUM CHLORIDE: 600; 310; 30; 20 INJECTION, SOLUTION INTRAVENOUS at 13:11

## 2018-11-28 RX ADMIN — ACETAMINOPHEN 975 MG: 325 TABLET, FILM COATED ORAL at 11:33

## 2018-11-28 RX ADMIN — PREGABALIN 75 MG: 75 CAPSULE ORAL at 11:33

## 2018-11-28 RX ADMIN — FENTANYL CITRATE 50 MCG: 50 INJECTION, SOLUTION INTRAMUSCULAR; INTRAVENOUS at 12:05

## 2018-11-28 RX ADMIN — KETOROLAC TROMETHAMINE 30 MG: 30 INJECTION, SOLUTION INTRAMUSCULAR at 17:07

## 2018-11-28 RX ADMIN — SODIUM CHLORIDE 125 ML/HR: 900 INJECTION, SOLUTION INTRAVENOUS at 15:43

## 2018-11-28 RX ADMIN — OXYCODONE HYDROCHLORIDE 10 MG: 10 TABLET, FILM COATED, EXTENDED RELEASE ORAL at 11:36

## 2018-11-28 RX ADMIN — ONDANSETRON 4 MG: 2 INJECTION INTRAMUSCULAR; INTRAVENOUS at 14:02

## 2018-11-28 RX ADMIN — Medication 2 G: at 17:07

## 2018-11-28 RX ADMIN — PRAVASTATIN SODIUM 40 MG: 20 TABLET ORAL at 21:04

## 2018-11-28 RX ADMIN — BUPIVACAINE HYDROCHLORIDE 2.2 ML: 5 INJECTION, SOLUTION EPIDURAL; INTRACAUDAL at 12:13

## 2018-11-28 RX ADMIN — FENTANYL CITRATE 50 MCG: 50 INJECTION, SOLUTION INTRAMUSCULAR; INTRAVENOUS at 12:03

## 2018-11-28 RX ADMIN — SODIUM CHLORIDE, SODIUM LACTATE, POTASSIUM CHLORIDE, AND CALCIUM CHLORIDE 125 ML/HR: 600; 310; 30; 20 INJECTION, SOLUTION INTRAVENOUS at 14:34

## 2018-11-28 RX ADMIN — Medication 70 MCG/KG/MIN: at 12:33

## 2018-11-28 RX ADMIN — SODIUM CHLORIDE 500 ML: 900 INJECTION, SOLUTION INTRAVENOUS at 18:31

## 2018-11-28 RX ADMIN — CEFAZOLIN 0.2 G: 1 INJECTION, POWDER, FOR SOLUTION INTRAMUSCULAR; INTRAVENOUS; PARENTERAL at 11:32

## 2018-11-28 RX ADMIN — SODIUM CHLORIDE, POTASSIUM CHLORIDE, SODIUM LACTATE AND CALCIUM CHLORIDE: 600; 310; 30; 20 INJECTION, SOLUTION INTRAVENOUS at 13:45

## 2018-11-28 RX ADMIN — DEXAMETHASONE SODIUM PHOSPHATE 8 MG: 4 INJECTION, SOLUTION INTRA-ARTICULAR; INTRALESIONAL; INTRAMUSCULAR; INTRAVENOUS; SOFT TISSUE at 12:54

## 2018-11-28 RX ADMIN — SENNOSIDES AND DOCUSATE SODIUM 1 TABLET: 8.6; 5 TABLET ORAL at 17:07

## 2018-11-28 RX ADMIN — ACETAMINOPHEN 650 MG: 325 TABLET, FILM COATED ORAL at 17:07

## 2018-11-28 RX ADMIN — FAMOTIDINE 20 MG: 20 TABLET ORAL at 17:07

## 2018-11-28 RX ADMIN — ASPIRIN 81 MG: 81 TABLET, COATED ORAL at 17:07

## 2018-11-28 RX ADMIN — OXYCODONE HYDROCHLORIDE 5 MG: 5 TABLET ORAL at 19:40

## 2018-11-28 RX ADMIN — LIDOCAINE HYDROCHLORIDE 5 ML: 20 INJECTION, SOLUTION INFILTRATION; PERINEURAL at 12:28

## 2018-11-28 NOTE — PROGRESS NOTES
1825: Patient blood pressure 89/50. Patient asymptomatic. Bolus given as ordered. Call placed to [facility]. Message left. 1840: Call placed to Dr. Paulino Lefort. Message left 1859: Spoke to Dr. Paulino Lefort. Orders to complete bolus. Monitor pressure. Continue tylenol and toradol. Hold oxycodone until pressure is higher. Will continue to monitor. 1735: Patient blood pressure now 106/56. Patient pain now 7/10 to right hip. Notified Dr. Paulino Lefort. OK to give oxycodone. Will continue to monitor. Bedside shift change report given to Shea Waite (oncoming nurse) by Laverne Reilly RN (offgoing nurse). Report included the following information SBAR, Kardex, Intake/Output, MAR and Recent Results.

## 2018-11-28 NOTE — ANESTHESIA PROCEDURE NOTES
Spinal Block Start time: 11/28/2018 12:03 PM 
End time: 11/28/2018 12:13 PM 
Performed by: Fletcher Owens MD 
Authorized by: Fletcher Owens MD  
 
Pre-procedure: Indications: at surgeon's request and primary anesthetic  Preanesthetic Checklist: patient identified, risks and benefits discussed, anesthesia consent, site marked, patient being monitored and timeout performed Timeout Time: 12:03 Spinal Block:  
Patient Position:  Seated Prep Region:  Lumbar Prep: DuraPrep Location:  L3-4 Technique:  Single shot Needle:  
Needle Type:  Pencan Needle Gauge:  25 G Attempts:  1 Events: CSF confirmed, no blood with aspiration and no paresthesia Assessment: 
Insertion:  Uncomplicated Patient tolerance:  Patient tolerated the procedure well with no immediate complications

## 2018-11-28 NOTE — ANESTHESIA PREPROCEDURE EVALUATION
Anesthetic History No history of anesthetic complications Review of Systems / Medical History Patient summary reviewed, nursing notes reviewed and pertinent labs reviewed Pulmonary Within defined limits Asthma Neuro/Psych Within defined limits Cardiovascular Within defined limits Hypertension Exercise tolerance: >4 METS 
  
GI/Hepatic/Renal 
Within defined limits Endo/Other Within defined limits Obesity and arthritis Other Findings Comments: Ankylosing spondylitis in lumbar back Physical Exam 
 
Airway Mallampati: II 
 
Neck ROM: normal range of motion Mouth opening: Normal 
 
 Cardiovascular Regular rate and rhythm,  S1 and S2 normal,  no murmur, click, rub, or gallop Rhythm: regular Rate: normal 
 
 
 
 Dental 
No notable dental hx Pulmonary Breath sounds clear to auscultation Abdominal 
GI exam deferred Other Findings Anesthetic Plan ASA: 2 Anesthesia type: spinal 
 
 
 
 
Induction: Intravenous Anesthetic plan and risks discussed with: Patient

## 2018-11-28 NOTE — PROGRESS NOTES
TRANSFER - OUT REPORT: 
 
Verbal report given to PETER Lombardi RN(name) on [employer]  being transferred to [facility](unit) for routine progression of care Report consisted of patients Situation, Background, Assessment and  
Recommendations(SBAR). Information from the following report(s) SBAR and Kardex was reviewed with the receiving nurse. Lines:  
Peripheral IV 11/28/18 Right Forearm (Active) Site Assessment Clean, dry, & intact 11/28/2018  2:34 PM  
Phlebitis Assessment 0 11/28/2018  2:34 PM  
Infiltration Assessment 0 11/28/2018  2:34 PM  
Dressing Status Intact 11/28/2018  2:34 PM  
Dressing Type Transparent 11/28/2018  2:34 PM  
Hub Color/Line Status Pink; Infusing 11/28/2018  2:34 PM  
  
 
Opportunity for questions and clarification was provided. Patient transported with: 
 Registered Nurse

## 2018-11-28 NOTE — PROGRESS NOTES
Problem: Falls - Risk of 
Goal: *Absence of Falls Document Andres Conroy Fall Risk and appropriate interventions in the flowsheet. Outcome: Progressing Towards Goal 
Fall Risk Interventions: 
Mobility Interventions: Communicate number of staff needed for ambulation/transfer, OT consult for ADLs, Patient to call before getting OOB, PT Consult for mobility concerns, PT Consult for assist device competence, Utilize walker, cane, or other assistive device, Utilize gait belt for transfers/ambulation, Bed/chair exit alarm Medication Interventions: Assess postural VS orthostatic hypotension, Patient to call before getting OOB, Teach patient to arise slowly, Bed/chair exit alarm Elimination Interventions: Call light in reach, Patient to call for help with toileting needs, Toileting schedule/hourly rounds, Bed/chair exit alarm, Urinal in reach History of Falls Interventions: Door open when patient unattended, Bed/chair exit alarm, Investigate reason for fall, Utilize gait belt for transfer/ambulation

## 2018-11-28 NOTE — PERIOP NOTES
Preop: skin assessment:  Clear except osiel complextion. Lifted R small toe nail. Bandaid changed. Dr. Laurence Garcia examined patient. No new orders.

## 2018-11-28 NOTE — ANESTHESIA POSTPROCEDURE EVALUATION
Procedure(s): RIGHT TOTAL HIP ARTHROPLASTY-DIRECT ANTERIOR- MAKOPLASTY. Anesthesia Post Evaluation Multimodal analgesia: multimodal analgesia not used between 6 hours prior to anesthesia start to PACU discharge Patient location during evaluation: PACU Patient participation: complete - patient participated Level of consciousness: awake Pain management: adequate Airway patency: patent Anesthetic complications: no 
Cardiovascular status: acceptable, blood pressure returned to baseline and hemodynamically stable Respiratory status: acceptable Hydration status: acceptable Visit Vitals BP 90/52 Pulse 90 Temp 36.3 °C (97.4 °F) Resp 14 Ht 5' 10\" (1.778 m) Wt 96.6 kg (212 lb 15.4 oz) SpO2 97% BMI 30.56 kg/m²

## 2018-11-28 NOTE — PROGRESS NOTES
11/28/2018 4:34 PM Case management consult for discharge planning and rw received. Met with pt. Charted address and phone numbers confirmed. Reason for Admission: Elective total right hip arthroplasty with Dr. Renu Nick. RRAT Score: 1 Plan for utilizing home health: n/a, pt will start outpatient PT starting on 12/3 at [address] [facility] on [address]. Pt is aware of appt. Likelihood of Readmission: low Transition of Care Plan: home with family and outpatient PT Pt lives with his wife in a 1 story home in [address] [facility], there is a ramp to enter. Pt has rx coverage and fills his scripts at [facility]. Pt owns a cane. Pt's wife will transport pt home and to outpatient PT appts. CM will follow. APOLINAR Serrano Care Management Interventions PCP Verified by CM: Yes(Patient Frist on Inna ) Mode of Transport at Discharge: Other (see comment)(Pt's wife, Ev Pulse ) Transition of Care Consult (CM Consult): Discharge Planning MyChart Signup: No 
Discharge Durable Medical Equipment: Yes(Rolling Walker order sent to [facility] via [employer]. ) Physical Therapy Consult: Yes Occupational Therapy Consult: Yes Speech Therapy Consult: No 
Current Support Network: Lives with Spouse, Own Home Confirm Follow Up Transport: Family

## 2018-11-28 NOTE — PROGRESS NOTES
Problem: Mobility Impaired (Adult and Pediatric) Goal: *Acute Goals and Plan of Care (Insert Text) Physical Therapy Goals Initiated 11/28/2018 1. Patient will move from supine to sit and sit to supine , scoot up and down and roll side to side in bed with modified independence within 7 day(s). 2.  Patient will transfer from bed to chair and chair to bed with modified independence using the least restrictive device within 7 day(s). 3.  Patient will perform sit <> stand with modified independence within 7 day(s). 4.  Patient will ambulate with modified independence for 150 feet with the least restrictive device within 7 day(s). 5.  Patient will ascend/descend 1 step up with 1 handrail(s) with modified independence within 7 day(s). physical Therapy EVALUATION Patient: Shereen Mccartney [de-identified]66 y.o. male) Date: 11/28/2018 Primary Diagnosis: DJD RIGHT HIP Primary osteoarthritis of right hip Procedure(s) (LRB): 
RIGHT TOTAL HIP ARTHROPLASTY-DIRECT ANTERIOR- MAKOPLASTY (Right) Day of Surgery Precautions:   Bed Alarm, Fall, WBAT(Anterior Hip) ASSESSMENT : 
Based on the objective data described below, the patient presents with good recall of information from pre op joint class. Patient with decreased ROM and strength as well as pain  right LIYA earlier today. He demonstrates excellent effort with good upper body strength and ability to amb short distance 10 x 2 on POD #0 and asymptomatic except for right anterior hip and groin soreness. Patient completed all exercises and educated on fall prevention. Patient will need RW but has SPC and he lives in one level rancher with ramp and a single 4\" step to enter. His wife is able to assist prn and at present patient's in laws are living with them due to losing their home [place] [facility] [place]. Anticipate steady recovery and achieve goals of MOD I with mobility by discharge. Patient will benefit from skilled intervention to address the above impairments. Patients rehabilitation potential is considered to be Excellent Factors which may influence rehabilitation potential include:  
[x]         None noted 
[]         Mental ability/status []         Medical condition 
[]         Home/family situation and support systems 
[]         Safety awareness 
[]         Pain tolerance/management 
[]         Other: PLAN : 
Recommendations and Planned Interventions: 
[x]           Bed Mobility Training             []    Neuromuscular Re-Education 
[x]           Transfer Training                   []    Orthotic/Prosthetic Training 
[x]           Gait Training                         []    Modalities [x]           Therapeutic Exercises           []    Edema Management/Control 
[x]           Therapeutic Activities            [x]    Patient and Family Training/Education 
[]           Other (comment): Frequency/Duration: Patient will be followed by physical therapy  twice daily to address goals. Discharge Recommendations: Home Health Further Equipment Recommendations for Discharge: rolling walker SUBJECTIVE:  
Patient stated I have really no pain - just soreness right now.  OBJECTIVE DATA SUMMARY:  
HISTORY:   
Past Medical History:  
Diagnosis Date  Arthritis   
 ankylosing spondylitis  Asthma   
 childhood- no problem after age 17  Cancer ([facility] [address] Skin cancer x3 on head ([facility] and [facility])  Chronic pain   
 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  
Procedure Laterality Date  HX ORTHOPAEDIC Right 2003  
 bone fusion thumb  HX OTHER SURGICAL    
 3 surgeries to remove skin cancer (Mohs) Prior Level of Function/Home Situation:  
Personal factors and/or comorbidities impacting plan of care:  
 
Home Situation Home Environment: Private residence # Steps to Enter: 1 Rails to Enter: No 
 Wheelchair Ramp: Yes One/Two Story Residence: One story Living Alone: No 
Support Systems: Spouse/Significant Other/Partner Patient Expects to be Discharged to[de-identified] Private residence Current DME Used/Available at Home: Cane, straight, Raised toilet seat, Safety frame toliet, Shower chair EXAMINATION/PRESENTATION/DECISION MAKING: Critical Behavior: 
Neurologic State: Alert Orientation Level: Oriented X4 Cognition: Appropriate decision making, Appropriate for age attention/concentration, Appropriate safety awareness, Follows commands Range Of Motion: 
AROM: Within functional limits PROM: Within functional limits Strength:   
Strength: Within functional limits Tone & Sensation:  
Tone: Normal 
  
  
  
  
Sensation: Intact Coordination: 
Coordination: Within functional limits Vision:  
  
Functional Mobility: 
Bed Mobility: 
  
Supine to Sit: Contact guard assistance Sit to Supine: Contact guard assistance Scooting: Stand-by assistance Transfers: 
Sit to Stand: Contact guard assistance Stand to Sit: Contact guard assistance Stand Pivot Transfers: Contact guard assistance Bed to Chair: Contact guard assistance Balance:  
Sitting: Intact; Without support Standing: Intact; With supportAmbulation/Gait Training:Distance (ft): 10 Feet (ft) Assistive Device: Gait belt;Walker, rolling Ambulation - Level of Assistance: Contact guard assistance; Additional time;Assist x2 Gait Abnormalities: Antalgic Left Side Weight Bearing: As tolerated Base of Support: Narrowed Speed/Veronika: Slow Stairs - Level of Assistance: (NT) Therapeutic Exercises:  
Initiated program with handout Functional Measure: 
Barthel Index: 
 
Bathin Bladder: 10 Bowels: 10 
Groomin Dressin Feeding: 10 Mobility: 10 Stairs: 0 Toilet Use: 5 Transfer (Bed to Chair and Back): 10 Total: 65 
 
 
 Barthel and G-code impairment scale: 
Percentage of impairment CH 
0% CI 
1-19% CJ 
20-39% CK 
40-59% CL 
60-79% CM 
80-99% CN 
100% Barthel Score 0-100 100 99-80 79-60 59-40 20-39 1-19 
 0 Barthel Score 0-20 20 17-19 13-16 9-12 5-8 1-4 0 The Barthel ADL Index: Guidelines 1. The index should be used as a record of what a patient does, not as a record of what a patient could do. 2. The main aim is to establish degree of independence from any help, physical or verbal, however minor and for whatever reason. 3. The need for supervision renders the patient not independent. 4. A patient's performance should be established using the best available evidence. Asking the patient, friends/relatives and nurses are the usual sources, but direct observation and common sense are also important. However direct testing is not needed. 5. Usually the patient's performance over the preceding 24-48 hours is important, but occasionally longer periods will be relevant. 6. Middle categories imply that the patient supplies over 50 per cent of the effort. 7. Use of aids to be independent is allowed. Khris Moss., Barthel, HAJA. (1787). Functional evaluation: the Barthel Index. [address] [facility] (14)2. DANNI Jimenez, Tam Saldivar., Rebecca Mays., [place], [address] [place] [address] (1999). Measuring the change indisability after inpatient rehabilitation; comparison of the responsiveness of the Barthel Index and Functional [place] Measure. Journal of Neurology, Neurosurgery, and Psychiatry, 66(4), 659-819. Raymond Kumar, N.J.A, MARISSA Chan, & Susan Mcleod, M.A. (2004.) Assessment of post-stroke quality of life in cost-effectiveness studies: The usefulness of the Barthel Index and the EuroQoL-5D. [facility], 13, 304-89 G codes: In compliance with CMSs Claims Based Outcome Reporting, the following G-code set was chosen for this patient based on their primary functional limitation being treated: The outcome measure chosen to determine the severity of the functional limitation was the Barthel Index with a score of 65/100 which was correlated with the impairment scale. ? Mobility - Walking and Moving Around:  
  - CURRENT STATUS: CJ - 20%-39% impaired, limited or restricted  - GOAL STATUS: CI - 1%-19% impaired, limited or restricted  - D/C STATUS:  ---------------To be determined--------------- Physical Therapy Evaluation Charge Determination History Examination Presentation Decision-Making LOW Complexity : Zero comorbidities / personal factors that will impact the outcome / POC LOW Complexity : 1-2 Standardized tests and measures addressing body structure, function, activity limitation and / or participation in recreation  LOW Complexity : Stable, uncomplicated  Other outcome measures Barthel Index  LOW Based on the above components, the patient evaluation is determined to be of the following complexity level: LOW Pain: 
Pain Scale 1: Numeric (0 - 10) Pain Intensity 1: 3 Pain Location 1: Hip Pain Orientation 1: Right Pain Description 1: Aching Pain Intervention(s) 1: Medication (see MAR) Activity Tolerance:  
Good- asymptomatic Please refer to the flowsheet for vital signs taken during this treatment. After treatment:  
[]         Patient left in no apparent distress sitting up in chair 
[x]         Patient left in no apparent distress in bed 
[x]         Call bell left within reach [x]         Nursing notified 
[x]         Caregiver present [x]         Bed alarm activated COMMUNICATION/EDUCATION:  
The patients plan of care was discussed with: Registered Nurse. [x]         Fall prevention education was provided and the patient/caregiver indicated understanding. [x]         Patient/family have participated as able in goal setting and plan of care. []         Patient/family agree to work toward stated goals and plan of care. []         Patient understands intent and goals of therapy, but is neutral about his/her participation. []         Patient is unable to participate in goal setting and plan of care. Thank you for this referral. 
Doe Kan, PT Time Calculation: 25 mins

## 2018-11-29 VITALS
SYSTOLIC BLOOD PRESSURE: 102 MMHG | WEIGHT: 212.96 LBS | RESPIRATION RATE: 16 BRPM | OXYGEN SATURATION: 93 % | HEIGHT: 70 IN | BODY MASS INDEX: 30.49 KG/M2 | DIASTOLIC BLOOD PRESSURE: 57 MMHG | HEART RATE: 89 BPM | TEMPERATURE: 98.4 F

## 2018-11-29 LAB
ANION GAP SERPL CALC-SCNC: 6 MMOL/L (ref 5–15)
BUN SERPL-MCNC: 16 MG/DL (ref 6–20)
BUN/CREAT SERPL: 10 (ref 12–20)
CALCIUM SERPL-MCNC: 7.9 MG/DL (ref 8.5–10.1)
CHLORIDE SERPL-SCNC: 105 MMOL/L (ref 97–108)
CO2 SERPL-SCNC: 25 MMOL/L (ref 21–32)
CREAT SERPL-MCNC: 1.56 MG/DL (ref 0.7–1.3)
GLUCOSE SERPL-MCNC: 142 MG/DL (ref 65–100)
HGB BLD-MCNC: 11.1 G/DL (ref 12.1–17)
POTASSIUM SERPL-SCNC: 5 MMOL/L (ref 3.5–5.1)
SODIUM SERPL-SCNC: 136 MMOL/L (ref 136–145)

## 2018-11-29 PROCEDURE — 97110 THERAPEUTIC EXERCISES: CPT

## 2018-11-29 PROCEDURE — 85018 HEMOGLOBIN: CPT

## 2018-11-29 PROCEDURE — 80048 BASIC METABOLIC PNL TOTAL CA: CPT

## 2018-11-29 PROCEDURE — 97535 SELF CARE MNGMENT TRAINING: CPT

## 2018-11-29 PROCEDURE — 74011250637 HC RX REV CODE- 250/637: Performed by: PHYSICIAN ASSISTANT

## 2018-11-29 PROCEDURE — 97116 GAIT TRAINING THERAPY: CPT

## 2018-11-29 PROCEDURE — 97165 OT EVAL LOW COMPLEX 30 MIN: CPT

## 2018-11-29 PROCEDURE — 97530 THERAPEUTIC ACTIVITIES: CPT

## 2018-11-29 PROCEDURE — 74011000250 HC RX REV CODE- 250: Performed by: PHYSICIAN ASSISTANT

## 2018-11-29 PROCEDURE — 74011250636 HC RX REV CODE- 250/636: Performed by: PHYSICIAN ASSISTANT

## 2018-11-29 PROCEDURE — 36415 COLL VENOUS BLD VENIPUNCTURE: CPT

## 2018-11-29 RX ORDER — FOLIC ACID 1 MG/1
1 TABLET ORAL EVERY EVENING
Status: DISCONTINUED | OUTPATIENT
Start: 2018-11-29 | End: 2018-11-29 | Stop reason: HOSPADM

## 2018-11-29 RX ADMIN — ACETAMINOPHEN 650 MG: 325 TABLET, FILM COATED ORAL at 00:31

## 2018-11-29 RX ADMIN — Medication 2 G: at 11:50

## 2018-11-29 RX ADMIN — OXYCODONE HYDROCHLORIDE 5 MG: 5 TABLET ORAL at 00:34

## 2018-11-29 RX ADMIN — SENNOSIDES AND DOCUSATE SODIUM 1 TABLET: 8.6; 5 TABLET ORAL at 08:53

## 2018-11-29 RX ADMIN — OXYCODONE HYDROCHLORIDE 10 MG: 5 TABLET ORAL at 08:53

## 2018-11-29 RX ADMIN — OXYCODONE HYDROCHLORIDE 5 MG: 5 TABLET ORAL at 16:41

## 2018-11-29 RX ADMIN — ACETAMINOPHEN 650 MG: 325 TABLET, FILM COATED ORAL at 06:00

## 2018-11-29 RX ADMIN — Medication 10 ML: at 06:00

## 2018-11-29 RX ADMIN — KETOROLAC TROMETHAMINE 30 MG: 30 INJECTION, SOLUTION INTRAMUSCULAR at 00:31

## 2018-11-29 RX ADMIN — KETOROLAC TROMETHAMINE 30 MG: 30 INJECTION, SOLUTION INTRAMUSCULAR at 11:50

## 2018-11-29 RX ADMIN — ACETAMINOPHEN 650 MG: 325 TABLET, FILM COATED ORAL at 11:50

## 2018-11-29 RX ADMIN — ASPIRIN 81 MG: 81 TABLET, COATED ORAL at 08:53

## 2018-11-29 RX ADMIN — Medication 2 G: at 02:34

## 2018-11-29 RX ADMIN — FAMOTIDINE 20 MG: 20 TABLET ORAL at 08:53

## 2018-11-29 RX ADMIN — KETOROLAC TROMETHAMINE 30 MG: 30 INJECTION, SOLUTION INTRAMUSCULAR at 06:00

## 2018-11-29 RX ADMIN — Medication 10 ML: at 11:51

## 2018-11-29 RX ADMIN — POLYETHYLENE GLYCOL (3350) 17 G: 17 POWDER, FOR SOLUTION ORAL at 08:53

## 2018-11-29 NOTE — PROGRESS NOTES
Problem: Falls - Risk of 
Goal: *Absence of Falls Document [facility] Fall Risk and appropriate interventions in the flowsheet. Outcome: Progressing Towards Goal 
Fall Risk Interventions: 
Mobility Interventions: Communicate number of staff needed for ambulation/transfer, OT consult for ADLs, Patient to call before getting OOB, PT Consult for mobility concerns, PT Consult for assist device competence, Utilize walker, cane, or other assistive device, Utilize gait belt for transfers/ambulation, Bed/chair exit alarm Medication Interventions: Assess postural VS orthostatic hypotension, Patient to call before getting OOB, Teach patient to arise slowly, Bed/chair exit alarm Elimination Interventions: Call light in reach, Patient to call for help with toileting needs, Toileting schedule/hourly rounds, Bed/chair exit alarm, Urinal in reach History of Falls Interventions: Door open when patient unattended, Bed/chair exit alarm, Investigate reason for fall, Utilize gait belt for transfer/ambulation

## 2018-11-29 NOTE — PROGRESS NOTES
Multiple scripts handed to patient including a script for Oxycodone IR and Tramadol. Patient stated he was allergic to Tramadol . Nurse destroyed script in front of patient. Nurse handed patient a copy of discharge instructions which were read and explained to him. Verbalized understanding

## 2018-11-29 NOTE — PROGRESS NOTES
Pt has been cleared by Jackie Grubbs PT and [address], pt has received flu shot this year.

## 2018-11-29 NOTE — DISCHARGE INSTRUCTIONS
TOTAL HIP DISCHARGE INSTRUCTIONS    Patient: Willie Cotter MRN: [id number]  SSN: [id number]              Please take the time to review the following instructions before you leave the hospital and use them as guidelines during your recovery from surgery. If you have any questions you may contact my office at [phone]. After hours or during the weekend you may reach me personally at [phone] if there is an emergency. SPECIAL INSTRUCTIONS :   1. Do not bend greater than 90 degrees at the hip for 4 weeks following your discharge  2. Avoid exercises or activities which bring the leg out or away from the mid-line of the body. The surgical repair involves this muscle and it will require 4 weeks to heal. You may disregard these instructions for a direct anterior approach. 3. You may walk as tolerated and are encouraged to work daily on progressing your activities with a walker initially. 4. You may transition to a cane for walking 5-7 days from surgery once you feel safe. You may use a walker for longer periods if you feel unstable. Wound Care/ Dressing Changes:      DRESSING :   Aquacel or Silverlon Dressings : This may be removed by home health 7 days after the date of your surgery. If there is no drainage, then a simple dressing may be used or no dressing at all. Other dressing options can be purchased over the counter at a local pharmacy or medical supply vendor. A porous adhesive dressing such as pictured above can be purchased at a local [facility] or [employer]. You only need to keep the incision covered for 7 days after showers. A dressing may be used for longer if there are issues with clothing clinging to the incision. Showering/ Bathing: You may shower with the aquacel dressing in place. This is left in place for 7 days following discharge from the hospital. If your incision is dry without drainage you may shower following your discharge home.   After 7 days your aquacel dressing should be removed for showering. It is fine to have water run over the incision. Do not vigorously scrub your incision. Apply a clean, dry dressing after you have dried your incision. Do not take a bath or get into a swimming pool / [employer] until you follow up with Dr. Sumit Kulkarni. Do not soak your incision under water. If there is continued drainage or you are concerned contact Dr Alena Hadring office prior to showering [phone] ext [phone] . Diet:  You may advance to your regular diet as tolerated. Increase your clear liquid intake for the next 2-3 days. Increase your protein intake for 30 days to promote healing. Common protein rich foods include: meat, fish, yogurt, milk, cheese, eggs, soy, and nut products. Continue to consume a balanced diet including whole grains, fruit and vegetables. If you are having difficulty consuming adequate protein, you may want to consider a protein supplement, such as Ensure High Protein or Boost Calorie Smart, 1-2 bottles per day. Medication:      1. You will be given prescriptions for pain medication when you are discharged from the hospital. The side effects of these medications can be substantial and the narcotic medications are not mandatory. You may substitute these medications with Tylenol or Alleve / Motrin. 2.  Please use the medications as prescribed. Pain medications may cause constipation- Colace twice daily and Miralax one scoop daily while taking the narcotic medication should help prevent constipation. Please discuss with your local pharmacist regarding increasing this dosage if constipation persists. Other possible side effects of pain medication are dizziness, headache, nausea, vomiting, and urinary retention. Discontinue the pain medication if you develop itching, rash, shortness of breath, or difficulties swallowing.   If these symptoms become severe or are not relieved by discontinuing the medication, you should seek immediate medical attention. 3. Refills of pain medication are authorized during office hours only (8 AM- 5 PM  Monday thru Friday). Many of these medication will require you or a family member to pick-up a physical prescription at the office. 4. Medications other than antiinflammatories will not be called into the pharmacy after business hours. 5. You may resume the medication(s) you were taking prior to your surgery. Narcotics may change the effects of some antidepressant medication(s). If you have any questions about possible interactions between your regular medications and the pain medication, you should ask the pharmacist or contact the prescribing physician. 6. If you have constipation which is not improved by oral stool softeners then a Ducolax suppository should be purchased over the counter. 7. Continue the blood thinner (Aspirin or Lovenox) for a total of 30 days following surgery. Follow up appointment:    Please call our office at [phone] for your follow up appointment. This should be scheduled 14 days following the date of surgery. Physical Therapy / Nursing:    Physical Therapy following surgery will be arranged at home along with at home nursing care. They have specific instructions for rehab and wound care. .     Returning to work:    Normal return to work is 3-12 weeks following total hip replacement. Depending on your progression following surgery and specific job duties you may take longer for a full return to work. DRIVING    You should not return to driving until you are off all narcotic pain medications and able to safely and quickly apply the brakes. This is normally 3-6 weeks for left hips and 4-8 weeks for right hip. Important Signs and Symptoms:    If any of the following signs or symptoms occur, you should contact Dr. Elba Kurtz office.   Please be advised if a problem arises which you feel requires immediate medical attention or you are unable to contact Dr. Elba Kurtz office you should seek immediate medical attention at the ER or other health care facility you have access to.    1. A sudden increase in swelling and/or redness or warmth at the area your surgery was performed which isnt relieved by rest, ice, and elevation. 2. Oral temperature greater than 101 degrees for 12 hours or more which isnt relieved by an increase in fluid intake and taking 2 Tylenol every 4-6 hours. 3. Excessive drainage from your incisions, or drainage which hasnt stopped by 72 hours after your surgery. 4. Fever, chills, shortness of breath, chest pain, nausea, vomiting or other signs and symptoms which are of concern to you. frequently asked questions   What should I take for pain?  o In general you will be discharged with three medications for pain (Extra Strength Tylenol, Oxycodone and Gabapentin). This may vary slightly depending on what you were taking in the hospital. USE ICE regularly, this is the most important modality for controlling pain. 1st Line - Oxycodone 1 (5mg) - 2 (10mg) every 4 hours (Or as directed), take these between Percocet doses if your pain is not below 4 / 10. This may be needed only for several days following your discharge. Extra Strength Tylenol 1-2 tablets (500-1000mg) every 4-6 hrs. 2nd Line - OTC tylenol (acetaminophen) 1000 mg every 6 hours, not to exceed 4,000 mg in any 24 hour period. Ibuprofen 800 mg every 6-8 hours, not to exceed 3 doses in any 24 hour period. Scheduled Gabapentin 1 tablet (100 mg) in the morning with breakfast and 3 tablets (300 mg) at night before bed.  When should I call for advice regarding my pain?  o After 12 hours on the above regimen, if nothing is working call the office [phone]) or call my cell after hours [phone].  Can I get refills?  o Narcotic refills are provided for the first 6 weeks following surgery.    o Try Tylenol 650mg along with Alleve 220mg or Motrin 200mg during the majority of the day. - Save the narcotic pain medications for physical therapy (1 hour prior) and before sleeping at night. - Keep in mind you need to discontinue these medications prior to returning to driving.  Is swelling normal?  o Almost everyone has some degree of swelling following surgery. o Following hip and knee replacement surgery, swelling can be normal below the incision for the first 1-2 weeks. - This swelling peaks around 5-7 days after surgery.   - You may have some bruising around the back of the thigh, calf and even into the foot.  What should I do for the swelling?  o Keep the limb elevated. o Apply compression socks (knee high for total knees and up to the mid-thigh for total hips.   o Heat or ice may be applied, choose the modality that makes you the most comfortable.  How long should I remain on blood thinners following surgery?  o Thirty days   When can I drive?  o Once you have stopped using regular narcotic pain medications (Percocet, Lortab, etc.) and can safely apply the brakes without hesitation.  When can I shower? o 72hours following surgery if the incision is dry.  o No submersion of the incision, bathing or swimming for 14 days following surgery or until cleared by Dr Mikki Stevenson.  What do I do with the dressing when I shower?  o The dressing can be removed. o The incision is sealed with Dermabond (Biologic glue) and except for wounds which are draining should be watertight.  How active should I be following surgery?   o Progress activities in moderation at your own pace.   o Walk each day and set progressive goals with small increments (1st week - ½ block of walking, 2nd week - 1 block, 3rd week - 2 blocks, etc.)    Please do not hesitate to call me at [phone] (cell phone) for questions following surgery - MD Janey Zepeda MD  Cell [phone]  Eric Shen PA-C  Cell [phone]  Medical Staff : Wanda Canas, [address] [facility] PMIR[facility]

## 2018-11-29 NOTE — PROGRESS NOTES
TOTAL HIP ARTHROPLASTY DAILY NOTE ASSESSMENT / PLAN :  
1. Pain Control : Excellent - Able to sleep and participate with therapy 2. Overnight Issues : none reported other than not sleeping very well 3. Wound or incisional issue : Healing incision with no visible drainage 4. Therapy / Weight Bearing Recommendations : Weight bear as tolerated with use of a walker and two person assist while mobilizing 5. DVT Prophylaxis : Mechanical and Aspirin and mechanical lower extremity compression device 6. Disposition : home - outpatient PT 
7. Medical Concerns : none - stable 8. Comments : discharge home today vs tomorrow after cleared by PT  
 
 
POD  1 Day Post-Op s/p Procedure(s): RIGHT TOTAL HIP ARTHROPLASTY-DIRECT ANTERIOR- Janeane Goodness SUBJECTIVE :  
 
Concerns : \"Dong fairly well so far - ice helps a lot\". OBJECTIVE :  
 
Vitals:  
 11/28/18 1923 11/28/18 1937 11/29/18 0040 11/29/18 0400 BP: 95/52 106/56 119/64 107/59 Pulse: 84  75 68 Resp: 16 18 18 Temp: 98.2 °F (36.8 °C)  97.7 °F (36.5 °C) 98.3 °F (36.8 °C) SpO2: 92%  94% 92% Weight:      
Height:      
 
 
Alert and oriented x3. right exam of the hip reveals that the dressing is clean, dry and intact. The patient is able to fire the quadriceps / flex at the hip Sensation is intact to light touch. No calf pain. Labs: 
Recent Labs  
  11/29/18 
0242 HGB 11.1*   
K 5.0  
 CO2 25 BUN 16  
CREA 1.56* * Patient mobility Gait Base of Support: Narrowed Speed/Veronika: Slow Gait Abnormalities: Antalgic Ambulation - Level of Assistance: Contact guard assistance, Additional time, Assist x2 Distance (ft): 10 Feet (ft) Assistive Device: Gait belt, Walker, rolling Stairs - Level of Assistance: (NT) Tiffany Rae MD 
Cell [phone] Zoë Prater PA-C Cell [phone] Medical Staff : Kameron Platt, [id number] [address] assistant [facility], [facility]--[phone] ext [phone]

## 2018-11-29 NOTE — PROGRESS NOTES
Problem: Mobility Impaired (Adult and Pediatric) Goal: *Acute Goals and Plan of Care (Insert Text) Physical Therapy Goals Initiated 11/28/2018 1. Patient will move from supine to sit and sit to supine , scoot up and down and roll side to side in bed with modified independence within 7 day(s). 2.  Patient will transfer from bed to chair and chair to bed with modified independence using the least restrictive device within 7 day(s). 3.  Patient will perform sit <> stand with modified independence within 7 day(s). 4.  Patient will ambulate with modified independence for 150 feet with the least restrictive device within 7 day(s). 5.  Patient will ascend/descend 1 step up with 1 handrail(s) with modified independence within 7 day(s). physical Therapy TREATMENT Patient: Niya Ontiveros [de-identified]66 y.o. male) Date: 11/29/2018 Diagnosis: DJD RIGHT HIP Primary osteoarthritis of right hip <principal problem not specified> Procedure(s) (LRB): 
RIGHT TOTAL HIP ARTHROPLASTY-DIRECT ANTERIOR- MAKOPLASTY (Right) 1 Day Post-Op Precautions: Bed Alarm, Fall, WBAT(Anterior Hip) Chart, physical therapy assessment, plan of care and goals were reviewed. ASSESSMENT: 
Pt received in bed, reporting 1/10 pain at rest. Celestino Looney to participate with physical therapy. SBA for functional tranfers and gait trainingx 76' using RW for steadying. Ascend/descend 3 platform step using RW fjor steadying, verbal cues for sequencing. Pt slightly SOB with activity O2 sat 98% quickly recovered with seated rest. BP stable, denies dizziness, lightheadedness with activity. Reports 4/10 pain with activity. Pt verbalize understanding to use RW for all gait and transfers. Pt is cleared fro discharge from hospital from PT perspective. RN notified Progression toward goals: 
[x]      Improving appropriately and progressing toward goals 
[]      Improving slowly and progressing toward goals []      Not making progress toward goals and plan of care will be adjusted PLAN: 
Patient continues to benefit from skilled intervention to address the above impairments. Continue treatment per established plan of care. Discharge Recommendations:  Outpatient Further Equipment Recommendations for Discharge:  none SUBJECTIVE:  
 
 
OBJECTIVE DATA SUMMARY:  
Functional Mobility Training: 
Bed Mobility: 
  
Supine to Sit: Modified independent Sit to Supine: Modified independent Scooting: Stand-by assistance Transfers: 
Sit to Stand: Stand-by assistance Stand to Sit: Stand-by assistance Ambulation/Gait Training: 
Distance (ft): 80 Feet (ft) Assistive Device: Walker, rolling;Gait belt Ambulation - Level of Assistance: Contact guard assistance Gait Abnormalities: Antalgic; Step to gait Base of Support: Widened Speed/Veronika: Pace decreased (<100 feet/min) Stairs: 
  
Stairs - Level of Assistance: Contact guard assistance Rail Use: None(platform steps) Therapeutic Exercises:  
Exercises performed per protocol. See morning treatment note for description. Pain: 
Pain Scale 1: Numeric (0 - 10) Pain Intensity 1: 0 Activity Tolerance:  
Good Please refer to the flowsheet for vital signs taken during this treatment. After treatment:  
[] Patient left in no apparent distress sitting up in chair 
[x] Patient left in no apparent distress in bed 
[x] Call bell left within reach [x] Nursing notified 
[] Caregiver present 
[] Bed alarm activated COMMUNICATION/COLLABORATION:  
The patients plan of care was discussed with: Registered Nurse Bryan Woodson Time Calculation: 30 mins

## 2018-11-29 NOTE — PROGRESS NOTES
Problem: Mobility Impaired (Adult and Pediatric) Goal: *Acute Goals and Plan of Care (Insert Text) Physical Therapy Goals Initiated 11/28/2018 1. Patient will move from supine to sit and sit to supine , scoot up and down and roll side to side in bed with modified independence within 7 day(s). 2.  Patient will transfer from bed to chair and chair to bed with modified independence using the least restrictive device within 7 day(s). 3.  Patient will perform sit <> stand with modified independence within 7 day(s). 4.  Patient will ambulate with modified independence for 150 feet with the least restrictive device within 7 day(s). 5.  Patient will ascend/descend 1 step up with 1 handrail(s) with modified independence within 7 day(s). physical Therapy TREATMENT Patient: Miah Peters [de-identified]66 y.o. male) Date: 11/29/2018 Diagnosis: DJD RIGHT HIP Primary osteoarthritis of right hip <principal problem not specified> Procedure(s) (LRB): 
RIGHT TOTAL HIP ARTHROPLASTY-DIRECT ANTERIOR- MAKOPLASTY (Right) 1 Day Post-Op Precautions: Bed Alarm, Fall, WBAT(Anterior Hip) Chart, physical therapy assessment, plan of care and goals were reviewed. ASSESSMENT: 
Pt received in bed, agreeable to participate with physical therapy. reports 1/10 pain at rest and  5/10 pain with activity. SBA for bed mobility, able to manage RLE independently. Denies dizziness with positional change. SBA for sit<>stand transfers. CGA for gait training x 60' using RW demonstrating step to gait pattern. Reviewed HEP for sitting and supported standing, tolerated all well. Pt returned to chair. May attempt stair training this afternoon Progression toward goals: 
[x]      Improving appropriately and progressing toward goals 
[]      Improving slowly and progressing toward goals 
[]      Not making progress toward goals and plan of care will be adjusted PLAN: 
Patient continues to benefit from skilled intervention to address the above impairments. Continue treatment per established plan of care. Discharge Recommendations:  Outpatient Further Equipment Recommendations for Discharge:  none SUBJECTIVE:  
Patient stated I did not sleep well. Angel Jones OBJECTIVE DATA SUMMARY:  
Critical Behavior: 
Neurologic State: Alert, Appropriate for age Orientation Level: Appropriate for age, Oriented X4 Cognition: Appropriate decision making, Appropriate for age attention/concentration, Appropriate safety awareness, Follows commands Functional Mobility Training: 
Bed Mobility: 
  
Supine to Sit: Stand-by assistance Scooting: Stand-by assistance Transfers: 
Sit to Stand: Contact guard assistance Stand to Sit: Contact guard assistance Balance: 
Sitting: Intact Standing: Intact; With supportAmbulation/Gait Training: 
Distance (ft): 60 Feet (ft) Assistive Device: Walker, rolling;Gait belt Ambulation - Level of Assistance: Contact guard assistance Gait Abnormalities: Antalgic; Step to gait Base of Support: Narrowed Speed/Veronika: Slow Stairs: Therapeutic Exercises:  
SUPINE 
EXERCISES Sets Reps Active Active Assist  
Passive Self ROM Comments Ankle Pumps   [x]                                        []                                        []                                        []                                          
Quad Sets   [x]                                        []                                        []                                        []                                          
Heel Slides   []                                        []                                        []                                        []                                          
Hip Abduction   []                                        []                                        []                                        [] Glut Sets   [x]                                        []                                        []                                        []                                          
   []                                        []                                        []                                        []                                          
   []                                        []                                        []                                        [] STANDING 
EXERCISES Sets Reps Active Active Assist  
Passive Self ROM Comments Heel Raises   [x]                                        []                                        []                                        []                                          
Hip Abduction   []                                        []                                        []                                        []                                          
   []                                        []                                        []                                        []                                          
   []                                        []                                        []                                        []                                          
Pain: 
Pain Scale 1: Numeric (0 - 10) Pain Intensity 1: 0 Pain Location 1: Knee Pain Description 1: Aching Pain Intervention(s) 1: Medication (see MAR) Activity Tolerance:  
good Please refer to the flowsheet for vital signs taken during this treatment. After treatment:  
[x] Patient left in no apparent distress sitting up in chair 
[] Patient left in no apparent distress in bed 
[x] Call bell left within reach [x] Nursing notified 
[] Caregiver present [x] chair alarm activated COMMUNICATION/COLLABORATION:  
 The patients plan of care was discussed with: Registered Nurse Cici Mays Time Calculation: 30 mins

## 2018-11-29 NOTE — PROGRESS NOTES
Occupational Therapy EVALUATION/discharge Patient: Katia Dixon [de-identified]66 y.o. male) Date: 11/29/2018 Primary Diagnosis: DJD RIGHT HIP Primary osteoarthritis of right hip Procedure(s) (LRB): 
RIGHT TOTAL HIP ARTHROPLASTY-DIRECT ANTERIOR- MAKOPLASTY (Right) 1 Day Post-Op Precautions:   Bed Alarm, Fall, WBAT(Anterior Hip) ASSESSMENT:  
Based on the objective data described below, the patient presents at min assist  level with LB bathing and mod I with  bed mobility, bathroom accessibility and toileting. Pt demonstated and verbalized understanding with AE for LB dressing. Discussed and demonstrated how to get in/out of tub shower. Verbalized understanding. Pt has purchased shower chair, hand held shower and safety frame over his handicap commode. Feel pt is safe for discharge once medically cleared Further skilled acute occupational therapy is not indicated at this time. Discharge Recommendations: Outpatient Further Equipment Recommendations for Discharge: rolling walker, recommended reacher and sock aide SUBJECTIVE:  
Patient stated \"I purchased one of those.  OBJECTIVE DATA SUMMARY:  
HISTORY:  
Past Medical History:  
Diagnosis Date  Arthritis   
 ankylosing spondylitis  Asthma   
 childhood- no problem after age 17  Cancer ([facility] [address] Skin cancer x3 on head (BCC and SCC)  Chronic pain   
 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  
Procedure Laterality Date  HX ORTHOPAEDIC Right 2003  
 bone fusion thumb  HX OTHER SURGICAL    
 3 surgeries to remove skin cancer (Mohs) Prior Level of Function/Environment/Context: independent, volunteers at the [employer]  
Occupations in which the patient is/was successful, what are the barriers preventing that success:  
Performance Patterns (routines, roles, habits, and rituals):  
 Personal Interests and/or values:  
Expanded or extensive additional review of patient history:  
 
Home Situation Home Environment: Private residence # Steps to Enter: 1 Rails to Enter: No 
Wheelchair Ramp: Yes One/Two Story Residence: One story Living Alone: No 
Support Systems: Spouse/Significant Other/Partner Patient Expects to be Discharged to[de-identified] Private residence Current DME Used/Available at Home: Cane, straight, Raised toilet seat, Safety frame toliet, Shower chair EXAMINATION OF PERFORMANCE DEFICITS: 
Cognitive/Behavioral Status: 
Neurologic State: Alert Orientation Level: Oriented X4 Cognition: Appropriate decision making Skin: intact Edema: none noted Hearing: 
  
Vision/Perceptual:   
    
    
    
  
    
    
Corrective Lenses: Glasses Range of Motion: 
 
AROM: Within functional limits Strength: 
 
Strength: Within functional limits Coordination: 
Coordination: Within functional limits Tone & Sensation: 
 
Tone: Normal 
Sensation: Intact Balance: 
Sitting: Intact Standing: Intact; With support Functional Mobility and Transfers for ADLs:Bed Mobility: 
Supine to Sit: Modified independent Sit to Supine: Modified independent Scooting: Stand-by assistance Transfers: 
Sit to Stand: Modified independent Stand to Sit: Modified independent Bathroom Mobility: Supervision/set up Toilet Transfer : Modified independent ADL Assessment: 
Feeding: Independent Oral Facial Hygiene/Grooming: Independent Bathing: Minimum assistance Upper Body Dressing: Independent Lower Body Dressing: Modified independent; Adaptive equipment Toileting: Modified independent ADL Intervention and task modifications: 
  
 
 LB dressing with AE Tub shower transfer Bathroom accessibility using RW Functional Measure: 
Barthel Index: 
 
Bathin Bladder: 10 Bowels: 10 
Groomin Dressing: 10 
 Feeding: 10 Mobility: 10 Stairs: 0 Toilet Use: 10 Transfer (Bed to Chair and Back): 10 Total: 75 Barthel and G-code impairment scale: 
Percentage of impairment CH 
0% CI 
1-19% CJ 
20-39% CK 
40-59% CL 
60-79% CM 
80-99% CN 
100% Barthel Score 0-100 100 99-80 79-60 59-40 20-39 1-19 
 0 Barthel Score 0-20 20 17-19 13-16 9-12 5-8 1-4 0 The Barthel ADL Index: Guidelines 1. The index should be used as a record of what a patient does, not as a record of what a patient could do. 2. The main aim is to establish degree of independence from any help, physical or verbal, however minor and for whatever reason. 3. The need for supervision renders the patient not independent. 4. A patient's performance should be established using the best available evidence. Asking the patient, friends/relatives and nurses are the usual sources, but direct observation and common sense are also important. However direct testing is not needed. 5. Usually the patient's performance over the preceding 24-48 hours is important, but occasionally longer periods will be relevant. 6. Middle categories imply that the patient supplies over 50 per cent of the effort. 7. Use of aids to be independent is allowed. Pretty Winkler., Barthel, D.W. (1550). Functional evaluation: the Barthel Index. [facility] (14)2. [facility] iveth DANNI Scott, Alex Anguiano., [facility].[address] (1999). Measuring the change indisability after inpatient rehabilitation; comparison of the responsiveness of the Barthel Index and Functional [place] Measure. Journal of Neurology, Neurosurgery, and Psychiatry, 66(4), 819-361. Judge Nugent, N.J.A, Luna Josue  WGARIMA.M, & Fab Roe, M.A. (2004.) Assessment of post-stroke quality of life in cost-effectiveness studies: The usefulness of the Barthel Index and the EuroQoL-5D. [facility], 13, 371-50 G codes: In compliance with CMSs Claims Based Outcome Reporting, the following G-code set was chosen for this patient based on their primary functional limitation being treated: The outcome measure chosen to determine the severity of the functional limitation was the Barthel with a score of 75/100 which was correlated with the impairment scale. ? Self Care:  
  - CURRENT STATUS: CJ - 20%-39% impaired, limited or restricted  - GOAL STATUS: CI - 1%-19% impaired, limited or restricted  - D/C STATUS:  CJ - 20%-39% impaired, limited or restricted Occupational Therapy Evaluation Charge Determination History Examination Decision-Making LOW Complexity : Brief history review  LOW Complexity : 1-3 performance deficits relating to physical, cognitive , or psychosocial skils that result in activity limitations and / or participation restrictions  LOW Complexity : No comorbidities that affect functional and no verbal or physical assistance needed to complete eval tasks Based on the above components, the patient evaluation is determined to be of the following complexity level: LOW Pain: 
Pain Scale 1: Numeric (0 - 10) Pain Intensity 1: 0 Pain Location 1: Knee Pain Description 1: Aching Pain Intervention(s) 1: Medication (see MAR) Activity Tolerance:  
Good Please refer to the flowsheet for vital signs taken during this treatment. After treatment:  
[]  Patient left in no apparent distress sitting up in chair 
[x]  Patient left in no apparent distress in bed 
[x]  Call bell left within reach 
[]  Nursing notified 
[]  Caregiver present 
[]  Bed alarm activated COMMUNICATION/EDUCATION:  
Communication/Collaboration: 
[x]      Home safety education was provided and the patient/caregiver indicated understanding. [x]      Patient/family have participated as able and agree with findings and recommendations. []      Patient is unable to participate in plan of care at this time.  
Findings and recommendations were discussed with: Physical Therapist and Registered Nurse John Zelaya, OTR/L Time Calculation: 29 mins

## 2018-11-29 NOTE — PROGRESS NOTES
Bedside and Verbal shift change report given to [address] [place] [address],[address] (oncoming nurse) by Jae Price RN (offgoing nurse). Report included the following information SBAR and Kardex.

## 2018-11-29 NOTE — PROGRESS NOTES
Problem: Falls - Risk of 
Goal: *Absence of Falls Document Bandar Rossi Fall Risk and appropriate interventions in the flowsheet. Outcome: Progressing Towards Goal 
Fall Risk Interventions: 
Mobility Interventions: Bed/chair exit alarm, OT consult for ADLs, Patient to call before getting OOB, PT Consult for mobility concerns, PT Consult for assist device competence, Utilize walker, cane, or other assistive device Medication Interventions: Bed/chair exit alarm, Patient to call before getting OOB, Teach patient to arise slowly Elimination Interventions: Bed/chair exit alarm, Call light in reach, Patient to call for help with toileting needs, Toileting schedule/hourly rounds History of Falls Interventions: Bed/chair exit alarm, Investigate reason for fall, Utilize gait belt for transfer/ambulation

## 2018-11-29 NOTE — PROGRESS NOTES
11/29/2018 11:05 AM Rw delivered to pt. Discharge order placed pending therapy clearance. Pt's wife will transport Jeison Santana

## 2018-11-29 NOTE — PROGRESS NOTES
NUTRITION  
RD Screen Pt seen for:   
 
[]  MST for     [x]   MD Consult [x]        Supplements  []   PO intake check  
[]        Food Allergies  []   Food Preferences/tolerances   
[]        Rescreen  []   Education []        Diet order clarification []   Other  
[]  LOS Nutrition Prescription:  
 
Increase protein intake for 30 days, via supplements or dietary sources, in addition to balanced meals. Encourage adequate intake of meals and supplements Assessment:  
Information obtained from:   Patient Received consult for general nutrition management and supplements for patient 1 day s/p RIGHT TOTAL HIP ARTHROPLASTY-DIRECT ANTERIOR- MAKOPLASTY. PMHx of HLD and HTN. Patient reports good intake and appetite both pre and post surgery. Denies nausea, GI distress or wt changes. Had 100% of breakfast and reports still hungry. Had eggs and cox this morning and planning on getting salmon for lunch. Author and patient discussed nutrient needs to promote healing after surgery including protein sources/intake and balanced meals to support micronutrient intake. Patient was understanding and receptive of topics discussed. Patient states he eats a lot of protein normally. Intake seems sufficient without the support of oral nutrition supplements. Cultural, Ep[employer]opalian and ethnic food preferences:  
[x]  None  
[]  Identified and addressed Diet:  regular PO Intake: [x]           Good     []           Fair      []           Poor Patient Vitals for the past 100 hrs: 
 % Diet Eaten 11/28/18 1832 100 % Wt Readings from Last 5 Encounters:  
11/28/18 96.6 kg (212 lb 15.4 oz)  
11/21/18 101.9 kg (224 lb 10.4 oz) ] Body mass index is 30.56 kg/m². Skin: right hip incision BM: 11/27 ABD: WDL Estimated Daily Nutrition Requirements: 
Kcals/day: 2419 Kcals/day(BMR 1777 x 1.3 AF) Protein: 97 g(-116g (1.0-1.2g/kg actual bw)) Fluid: 2300 ml(1mL/kcal) Based On: [place] 1898 Weight Used: Actual wt Weight Changes:  
[]   Loss 
[]   Gain 
[x]   Stable Nutrition Problems Identified[x]     None 
[]     Specified food preferences  
[]     Dislikes supplements             
[]     Allergies []     Difficulty chewing    
[]     Dentition  
[]     Nausea/Vomiting 
[]    Constipation []    Diarrhea Nutrition Diagnosis:  
  
Increased nutrient needs related to increased protein needs with wound healing evidenced by impaired skin integrity with right hip incision. Intervention:  
[x]    Obtained/adjusted food preferences/tolerances and/or snacks options []    Dislikes supplements will try a substitution []    Modify diet for food allergies []    Adjust texture due to difficulty chewing  
[]    Encourage Fresh Fruit, Activia yogurt, fluid  
[x]    Educated patient 
[]    Rescreen per screening protocol 
[]    Add Supplements Goal: patient to consume 1 protein item with meals and 50% of meals and snacks in the next 5-7 days Monitoring/Evaluation:  
Education & Discharge Needs [x] Nutrition related discharge needs addressed:  
[x] Supplements (on d/c instruction &/or coupons provided) [x] Education [] No nutrition related discharge needs at this time Rescreen: [x]  At Nutrition Risk  
       []  Not at Nutrition Risk, rescreen per screening protocol Ti Marinelli RD Pager [phone] Office [phone]

## 2018-12-01 NOTE — DISCHARGE SUMMARY
Total Hip Replacement Home Discharge Summary    Patient ID:  Daniel Estrella  1955  66 y.o.  [id number]    Admit date: 11/28/2018    Discharge date and time: 11/29/2018  6:54 PM     Admitting Physician: Anne Russell MD     Admission Diagnoses: DJD RIGHT HIP  Primary osteoarthritis of right hip    Discharge Diagnoses: Active Problems:    Primary osteoarthritis of right hip (11/28/2018)        Mau Dave MD      HOSPITAL COURSE:  Daniel Estrella was admitted on11/28/2018 and underwent successful total hip replacement. The patient was transferred to the orthopaedic floor in stable condition. The patient received the appropriate therapy while in the hospital.  Venous thrombo-embolic prophylaxis included ASA. The incision site remained clean and dry throughout the hospital stay. The patient remained neurovascularly intact. At the time of discharge, the patient was able to demonstrate an understanding of the explicit discharge precautions and instructions following surgery. Important in Hospital Events : Did well with surgery    Post Op complications: None    Cannot display discharge medications since this patient is not currently admitted.       Discharged to: Home    Follow-up : Scheduled for 2 weeks post-op      Signed:  Anne Russell MD  12/1/2018  8:22 AM

## 2019-06-08 ENCOUNTER — ED HISTORICAL/CONVERTED ENCOUNTER (OUTPATIENT)
Dept: OTHER | Age: 64
End: 2019-06-08

## 2022-03-19 PROBLEM — M16.11 PRIMARY OSTEOARTHRITIS OF RIGHT HIP: Status: ACTIVE | Noted: 2018-11-28

## 2023-12-01 ENCOUNTER — HOSPITAL ENCOUNTER (OUTPATIENT)
Facility: HOSPITAL | Age: 68
Setting detail: OUTPATIENT SURGERY
Discharge: HOME | End: 2023-12-01
Attending: INTERNAL MEDICINE | Admitting: INTERNAL MEDICINE
Payer: MEDICARE

## 2023-12-01 ENCOUNTER — ANESTHESIA (OUTPATIENT)
Facility: HOSPITAL | Age: 68
End: 2023-12-01
Payer: MEDICARE

## 2023-12-01 ENCOUNTER — ANESTHESIA EVENT (OUTPATIENT)
Facility: HOSPITAL | Age: 68
End: 2023-12-01
Payer: MEDICARE

## 2023-12-01 VITALS
DIASTOLIC BLOOD PRESSURE: 88 MMHG | HEART RATE: 68 BPM | TEMPERATURE: 97.3 F | SYSTOLIC BLOOD PRESSURE: 123 MMHG | HEIGHT: 70 IN | BODY MASS INDEX: 30.78 KG/M2 | WEIGHT: 215 LBS | OXYGEN SATURATION: 96 % | RESPIRATION RATE: 18 BRPM

## 2023-12-01 DIAGNOSIS — K62.89 RECTAL PAIN: ICD-10-CM

## 2023-12-01 DIAGNOSIS — K64.8 OTHER HEMORRHOIDS: ICD-10-CM

## 2023-12-01 DIAGNOSIS — K62.5 RECTAL BLEEDING: ICD-10-CM

## 2023-12-01 DIAGNOSIS — R19.4 CHANGE IN BOWEL HABITS: ICD-10-CM

## 2023-12-01 PROCEDURE — 88305 TISSUE EXAM BY PATHOLOGIST: CPT

## 2023-12-01 PROCEDURE — 2500000003 HC RX 250 WO HCPCS: Performed by: NURSE ANESTHETIST, CERTIFIED REGISTERED

## 2023-12-01 PROCEDURE — 3700000001 HC ADD 15 MINUTES (ANESTHESIA): Performed by: INTERNAL MEDICINE

## 2023-12-01 PROCEDURE — 7100000011 HC PHASE II RECOVERY - ADDTL 15 MIN: Performed by: INTERNAL MEDICINE

## 2023-12-01 PROCEDURE — 2580000003 HC RX 258: Performed by: INTERNAL MEDICINE

## 2023-12-01 PROCEDURE — 3700000000 HC ANESTHESIA ATTENDED CARE: Performed by: INTERNAL MEDICINE

## 2023-12-01 PROCEDURE — 2709999900 HC NON-CHARGEABLE SUPPLY: Performed by: INTERNAL MEDICINE

## 2023-12-01 PROCEDURE — 3600007502: Performed by: INTERNAL MEDICINE

## 2023-12-01 PROCEDURE — 7100000010 HC PHASE II RECOVERY - FIRST 15 MIN: Performed by: INTERNAL MEDICINE

## 2023-12-01 PROCEDURE — 3600007512: Performed by: INTERNAL MEDICINE

## 2023-12-01 PROCEDURE — 6360000002 HC RX W HCPCS: Performed by: NURSE ANESTHETIST, CERTIFIED REGISTERED

## 2023-12-01 RX ORDER — PROPOFOL 10 MG/ML
INJECTION, EMULSION INTRAVENOUS PRN
Status: DISCONTINUED | OUTPATIENT
Start: 2023-12-01 | End: 2023-12-01 | Stop reason: SDUPTHER

## 2023-12-01 RX ORDER — FOLIC ACID 1 MG/1
1000 TABLET ORAL DAILY
COMMUNITY
Start: 2023-11-15

## 2023-12-01 RX ORDER — ETODOLAC 500 MG/1
500 TABLET, EXTENDED RELEASE ORAL 2 TIMES DAILY
COMMUNITY
Start: 2023-11-14

## 2023-12-01 RX ORDER — SODIUM CHLORIDE, SODIUM LACTATE, POTASSIUM CHLORIDE, CALCIUM CHLORIDE 600; 310; 30; 20 MG/100ML; MG/100ML; MG/100ML; MG/100ML
INJECTION, SOLUTION INTRAVENOUS CONTINUOUS
Status: DISCONTINUED | OUTPATIENT
Start: 2023-12-01 | End: 2023-12-01 | Stop reason: HOSPADM

## 2023-12-01 RX ORDER — METHOTREXATE 2.5 MG/1
6 TABLET ORAL
COMMUNITY
Start: 2023-11-16

## 2023-12-01 RX ORDER — LIDOCAINE HYDROCHLORIDE 20 MG/ML
INJECTION, SOLUTION EPIDURAL; INFILTRATION; INTRACAUDAL; PERINEURAL PRN
Status: DISCONTINUED | OUTPATIENT
Start: 2023-12-01 | End: 2023-12-01 | Stop reason: SDUPTHER

## 2023-12-01 RX ORDER — AMLODIPINE BESYLATE 5 MG/1
5 TABLET ORAL DAILY
COMMUNITY
Start: 2018-10-23

## 2023-12-01 RX ORDER — ATORVASTATIN CALCIUM 40 MG/1
40 TABLET, FILM COATED ORAL NIGHTLY
COMMUNITY
Start: 2023-11-03

## 2023-12-01 RX ADMIN — PROPOFOL 25 MG: 10 INJECTION, EMULSION INTRAVENOUS at 13:21

## 2023-12-01 RX ADMIN — SODIUM CHLORIDE, POTASSIUM CHLORIDE, SODIUM LACTATE AND CALCIUM CHLORIDE: 600; 310; 30; 20 INJECTION, SOLUTION INTRAVENOUS at 13:07

## 2023-12-01 RX ADMIN — LIDOCAINE HYDROCHLORIDE 40 MG: 20 INJECTION, SOLUTION EPIDURAL; INFILTRATION; INTRACAUDAL; PERINEURAL at 13:09

## 2023-12-01 RX ADMIN — PROPOFOL 30 MG: 10 INJECTION, EMULSION INTRAVENOUS at 13:28

## 2023-12-01 RX ADMIN — PROPOFOL 100 MG: 10 INJECTION, EMULSION INTRAVENOUS at 13:09

## 2023-12-01 RX ADMIN — PROPOFOL 50 MG: 10 INJECTION, EMULSION INTRAVENOUS at 13:17

## 2023-12-01 RX ADMIN — PROPOFOL 25 MG: 10 INJECTION, EMULSION INTRAVENOUS at 13:13

## 2023-12-01 RX ADMIN — PROPOFOL 30 MG: 10 INJECTION, EMULSION INTRAVENOUS at 13:32

## 2023-12-01 ASSESSMENT — PAIN - FUNCTIONAL ASSESSMENT: PAIN_FUNCTIONAL_ASSESSMENT: NONE - DENIES PAIN

## 2023-12-01 ASSESSMENT — PAIN SCALES - GENERAL: PAINLEVEL_OUTOF10: 0

## 2023-12-01 NOTE — PERIOP NOTE
Patient alert and oriented x4, VS Stable, no complaints of pain at this time. Wife in waiting room. Bed in low position, call bell within reach. Patient states okay to review and give discharge instructions to Wong Fernando, wife.

## 2023-12-01 NOTE — ANESTHESIA POSTPROCEDURE EVALUATION
Department of Anesthesiology  Postprocedure Note    Patient: Nancy Desai  MRN: [id number]  YOB: 1955  Date of evaluation: 12/1/2023      Procedure Summary     Date: 12/01/23 Room / Location: [facility] / [facility]    Anesthesia Start: 1303 Anesthesia Stop: 7876    Procedures:       COLONOSCOPY, HEMORRHOID BANDING (Lower GI Region)      HEMORRHOID BANDING (Lower GI Region) Diagnosis:       Change in bowel habits      Other hemorrhoids      Rectal bleeding      Rectal pain      (Change in bowel habits [R19.4])      (Other hemorrhoids [K64.8])      (Rectal bleeding [K62.5])      (Rectal pain [K62.89])    Surgeons: Swetha Jolley MD Responsible Provider: Lary Almanzar MD    Anesthesia Type: MAC, TIVA ASA Status: 2          Anesthesia Type: No value filed. Yojana Phase I: Yojana Score: 10    Yojana Phase II:        Anesthesia Post Evaluation    Patient location during evaluation: bedside (Endoscopy Unit)  Patient participation: complete - patient participated  Level of consciousness: sleepy but conscious  Pain score: 0  Airway patency: patent  Nausea & Vomiting: no nausea and no vomiting  Complications: no  Cardiovascular status: hemodynamically stable  Respiratory status: acceptable  Hydration status: stable  Comments: This patient remained on the stretcher. The patient was handed off to the endoscopy nursing team.  All questions regarding pre-, intra-, and postoperative care were answered.   Multimodal analgesia pain management approach

## 2024-01-29 ENCOUNTER — HOSPITAL ENCOUNTER (EMERGENCY)
Facility: HOSPITAL | Age: 69
Discharge: HOME | End: 2024-01-29
Attending: STUDENT IN AN ORGANIZED HEALTH CARE EDUCATION/TRAINING PROGRAM
Payer: MEDICARE

## 2024-01-29 VITALS
BODY MASS INDEX: 30.78 KG/M2 | OXYGEN SATURATION: 94 % | HEART RATE: 67 BPM | HEIGHT: 70 IN | TEMPERATURE: 98.2 F | WEIGHT: 215 LBS | SYSTOLIC BLOOD PRESSURE: 147 MMHG | DIASTOLIC BLOOD PRESSURE: 93 MMHG | RESPIRATION RATE: 16 BRPM

## 2024-01-29 DIAGNOSIS — R07.9 CHEST PAIN, UNSPECIFIED TYPE: Primary | ICD-10-CM

## 2024-01-29 LAB
ALBUMIN SERPL-MCNC: 3.8 G/DL (ref 3.5–5)
ALBUMIN/GLOB SERPL: 1.1 (ref 1.1–2.2)
ALP SERPL-CCNC: 112 U/L (ref 45–117)
ALT SERPL-CCNC: 77 U/L (ref 12–78)
ANION GAP SERPL CALC-SCNC: 4 MMOL/L (ref 5–15)
AST SERPL W P-5'-P-CCNC: 42 U/L (ref 15–37)
BASOPHILS # BLD: 0.1 K/UL (ref 0–0.1)
BASOPHILS NFR BLD: 1 % (ref 0–1)
BILIRUB SERPL-MCNC: 0.6 MG/DL (ref 0.2–1)
BUN SERPL-MCNC: 16 MG/DL (ref 6–20)
BUN/CREAT SERPL: 12 (ref 12–20)
CA-I BLD-MCNC: 8.8 MG/DL (ref 8.5–10.1)
CHLORIDE SERPL-SCNC: 108 MMOL/L (ref 97–108)
CO2 SERPL-SCNC: 27 MMOL/L (ref 21–32)
CREAT SERPL-MCNC: 1.37 MG/DL (ref 0.7–1.3)
DIFFERENTIAL METHOD BLD: NORMAL
EKG ATRIAL RATE: 82 BPM
EKG DIAGNOSIS: NORMAL
EKG P AXIS: 48 DEGREES
EKG P-R INTERVAL: 130 MS
EKG Q-T INTERVAL: 376 MS
EKG QRS DURATION: 82 MS
EKG QTC CALCULATION (BAZETT): 439 MS
EKG R AXIS: -19 DEGREES
EKG T AXIS: 46 DEGREES
EKG VENTRICULAR RATE: 82 BPM
EOSINOPHIL # BLD: 0.1 K/UL (ref 0–0.4)
EOSINOPHIL NFR BLD: 2 % (ref 0–7)
ERYTHROCYTE [DISTWIDTH] IN BLOOD BY AUTOMATED COUNT: 12.3 % (ref 11.5–14.5)
GLOBULIN SER CALC-MCNC: 3.5 G/DL (ref 2–4)
GLUCOSE SERPL-MCNC: 124 MG/DL (ref 65–100)
HCT VFR BLD AUTO: 44.4 % (ref 36.6–50.3)
HGB BLD-MCNC: 15 G/DL (ref 12.1–17)
IMM GRANULOCYTES # BLD AUTO: 0 K/UL (ref 0–0.04)
IMM GRANULOCYTES NFR BLD AUTO: 0 % (ref 0–0.5)
LYMPHOCYTES # BLD: 2.7 K/UL (ref 0.8–3.5)
LYMPHOCYTES NFR BLD: 37 % (ref 12–49)
MCH RBC QN AUTO: 32 PG (ref 26–34)
MCHC RBC AUTO-ENTMCNC: 33.8 G/DL (ref 30–36.5)
MCV RBC AUTO: 94.7 FL (ref 80–99)
MONOCYTES # BLD: 0.8 K/UL (ref 0–1)
MONOCYTES NFR BLD: 11 % (ref 5–13)
NEUTS SEG # BLD: 3.6 K/UL (ref 1.8–8)
NEUTS SEG NFR BLD: 49 % (ref 32–75)
NRBC # BLD: 0 K/UL (ref 0–0.01)
NRBC BLD-RTO: 0 PER 100 WBC
PLATELET # BLD AUTO: 226 K/UL (ref 150–400)
PMV BLD AUTO: 9.3 FL (ref 8.9–12.9)
POTASSIUM SERPL-SCNC: 4 MMOL/L (ref 3.5–5.1)
PROT SERPL-MCNC: 7.3 G/DL (ref 6.4–8.2)
RBC # BLD AUTO: 4.69 M/UL (ref 4.1–5.7)
SODIUM SERPL-SCNC: 139 MMOL/L (ref 136–145)
TROPONIN I SERPL HS-MCNC: 6 NG/L (ref 0–76)
TROPONIN I SERPL HS-MCNC: 7 NG/L (ref 0–76)
WBC # BLD AUTO: 7.3 K/UL (ref 4.1–11.1)

## 2024-01-29 PROCEDURE — 80053 COMPREHEN METABOLIC PANEL: CPT

## 2024-01-29 PROCEDURE — 84484 ASSAY OF TROPONIN QUANT: CPT

## 2024-01-29 PROCEDURE — 36415 COLL VENOUS BLD VENIPUNCTURE: CPT

## 2024-01-29 PROCEDURE — 85025 COMPLETE CBC W/AUTO DIFF WBC: CPT

## 2024-01-29 PROCEDURE — 99284 EMERGENCY DEPT VISIT MOD MDM: CPT

## 2024-01-29 PROCEDURE — 93005 ELECTROCARDIOGRAM TRACING: CPT | Performed by: STUDENT IN AN ORGANIZED HEALTH CARE EDUCATION/TRAINING PROGRAM

## 2024-01-29 ASSESSMENT — HEART SCORE: ECG: 0

## 2024-01-29 ASSESSMENT — PAIN - FUNCTIONAL ASSESSMENT: PAIN_FUNCTIONAL_ASSESSMENT: 0-10

## 2024-01-29 ASSESSMENT — PAIN SCALES - GENERAL: PAINLEVEL_OUTOF10: 4

## 2024-01-29 ASSESSMENT — PAIN DESCRIPTION - LOCATION: LOCATION: CHEST

## 2024-01-30 NOTE — ED NOTES
Patient discharged by provider, discharge instructions provided to patient and reviewed, all questions answered. Vital signs stable, A/Ox4, breathing unlabored. Patient ambulatory on discharge

## 2024-01-30 NOTE — ED PROVIDER NOTES
Panel    Collection Time: 01/29/24  6:44 PM   Result Value Ref Range    Sodium 139 136 - 145 mmol/L    Potassium 4.0 3.5 - 5.1 mmol/L    Chloride 108 97 - 108 mmol/L    CO2 27 21 - 32 mmol/L    Anion Gap 4 (L) 5 - 15 mmol/L    Glucose 124 (H) 65 - 100 mg/dL    BUN 16 6 - 20 mg/dL    Creatinine 1.37 (H) 0.70 - 1.30 mg/dL    Bun/Cre Ratio 12 12 - 20      Est, Glom Filt Rate 57 (L) >60 ml/min/1.73m2    Calcium 8.8 8.5 - 10.1 mg/dL    Total Bilirubin 0.6 0.2 - 1.0 mg/dL    AST 42 (H) 15 - 37 U/L    ALT 77 12 - 78 U/L    Alk Phosphatase 112 45 - 117 U/L    Total Protein 7.3 6.4 - 8.2 g/dL    Albumin 3.8 3.5 - 5.0 g/dL    Globulin 3.5 2.0 - 4.0 g/dL    Albumin/Globulin Ratio 1.1 1.1 - 2.2     Troponin    Collection Time: 01/29/24  6:44 PM   Result Value Ref Range    Troponin, High Sensitivity 6 0 - 76 ng/L   Troponin    Collection Time: 01/29/24  8:41 PM   Result Value Ref Range    Troponin, High Sensitivity 7 0 - 76 ng/L       EKG: Initial EKG interpreted by me if performed. See ED course below    Radiologic Studies:  Non-plain film images such as CT, Ultrasound and MRI are read by the radiologist. Plain radiographic images are visualized and preliminarily interpreted by the ED Provider with findings available in ED course below.     Interpretation per the Radiologist below, if available at the time of this note:  No orders to display        EMERGENCY DEPARTMENT COURSE and DIFFERENTIAL DIAGNOSIS/MDM   CC/HPI Summary, DDx, ED Course, and Reassessment:     ED Course as of 01/29/24 2151 Mon Jan 29, 2024 1917 EKG interpreted by me shows normal sinus rhythm with a ventricular rate of 82, normal intervals, no ST elevation or depression [BQ]   2000 MDM: 68-year-old male presents for evaluation of chest pain.  Patient had a discrete episode of chest pain earlier today, associated with racing heart rate and dizziness.  3 similar episodes in the last month.  He is symptom-free now.  No personal or family history of coronary

## 2024-03-15 ENCOUNTER — TRANSCRIBE ORDERS (OUTPATIENT)
Facility: HOSPITAL | Age: 69
End: 2024-03-15

## 2024-03-15 DIAGNOSIS — R07.9 CHEST PAIN, UNSPECIFIED TYPE: ICD-10-CM

## 2024-03-15 DIAGNOSIS — I11.9 MALIGNANT HYPERTENSIVE HEART DISEASE WITHOUT HEART FAILURE: Primary | ICD-10-CM

## 2024-04-04 ENCOUNTER — HOSPITAL ENCOUNTER (OUTPATIENT)
Facility: HOSPITAL | Age: 69
Discharge: HOME | End: 2024-04-04
Attending: INTERNAL MEDICINE
Payer: MEDICARE

## 2024-04-04 ENCOUNTER — HOSPITAL ENCOUNTER (OUTPATIENT)
Facility: HOSPITAL | Age: 69
End: 2024-04-04
Attending: INTERNAL MEDICINE
Payer: MEDICARE

## 2024-04-04 ENCOUNTER — HOSPITAL ENCOUNTER (OUTPATIENT)
Facility: HOSPITAL | Age: 69
Discharge: HOME | End: 2024-04-06
Attending: INTERNAL MEDICINE
Payer: MEDICARE

## 2024-04-04 VITALS
HEIGHT: 70 IN | BODY MASS INDEX: 30.78 KG/M2 | WEIGHT: 215 LBS | HEART RATE: 61 BPM | DIASTOLIC BLOOD PRESSURE: 87 MMHG | SYSTOLIC BLOOD PRESSURE: 146 MMHG

## 2024-04-04 DIAGNOSIS — I11.9 MALIGNANT HYPERTENSIVE HEART DISEASE WITHOUT HEART FAILURE: ICD-10-CM

## 2024-04-04 DIAGNOSIS — R07.9 CHEST PAIN, UNSPECIFIED TYPE: ICD-10-CM

## 2024-04-04 LAB
ECHO BSA: 2.19 M2
NUC REST EJECTION FRACTION: 78 %
NUC STRESS EJECTION FRACTION: 78 %
STRESS BASELINE DIAS BP: 87 MMHG
STRESS BASELINE HR: 61 BPM
STRESS BASELINE SYS BP: 146 MMHG
STRESS PERCENT HR ACHIEVED: 64 %
STRESS POST PEAK HR: 99 BPM
STRESS ST DEPRESSION: 0 MM
STRESS STAGE 1 BP: NORMAL MMHG
STRESS STAGE 1 DURATION: NORMAL MIN:SEC
STRESS STAGE 1 HR: 96 BPM
STRESS STAGE 2 DURATION: NORMAL MIN:SEC
STRESS STAGE 2 HR: 91 BPM
STRESS STAGE 3 BP: NORMAL MMHG
STRESS STAGE 3 DURATION: NORMAL MIN:SEC
STRESS STAGE 3 HR: 89 BPM
STRESS STAGE 4 DURATION: NORMAL MIN:SEC
STRESS STAGE 4 HR: 80 BPM
STRESS STAGE 5 BP: NORMAL MMHG
STRESS STAGE 5 DURATION: NORMAL MIN:SEC
STRESS STAGE 5 HR: 79 BPM
STRESS TARGET HR: 154 BPM

## 2024-04-04 PROCEDURE — A9500 TC99M SESTAMIBI: HCPCS | Performed by: INTERNAL MEDICINE

## 2024-04-04 PROCEDURE — 3430000000 HC RX DIAGNOSTIC RADIOPHARMACEUTICAL: Performed by: INTERNAL MEDICINE

## 2024-04-04 PROCEDURE — 93017 CV STRESS TEST TRACING ONLY: CPT

## 2024-04-04 PROCEDURE — 6360000002 HC RX W HCPCS: Performed by: INTERNAL MEDICINE

## 2024-04-04 PROCEDURE — 6360000002 HC RX W HCPCS

## 2024-04-04 RX ORDER — AMINOPHYLLINE 25 MG/ML
125 INJECTION, SOLUTION INTRAVENOUS ONCE
Status: COMPLETED | OUTPATIENT
Start: 2024-04-04 | End: 2024-04-04

## 2024-04-04 RX ORDER — TETRAKIS(2-METHOXYISOBUTYLISOCYANIDE)COPPER(I) TETRAFLUOROBORATE 1 MG/ML
9.37 INJECTION, POWDER, LYOPHILIZED, FOR SOLUTION INTRAVENOUS
Status: COMPLETED | OUTPATIENT
Start: 2024-04-04 | End: 2024-04-04

## 2024-04-04 RX ORDER — TETRAKIS(2-METHOXYISOBUTYLISOCYANIDE)COPPER(I) TETRAFLUOROBORATE 1 MG/ML
32.5 INJECTION, POWDER, LYOPHILIZED, FOR SOLUTION INTRAVENOUS
Status: COMPLETED | OUTPATIENT
Start: 2024-04-04 | End: 2024-04-04

## 2024-04-04 RX ORDER — REGADENOSON 0.08 MG/ML
INJECTION, SOLUTION INTRAVENOUS
Status: COMPLETED
Start: 2024-04-04 | End: 2024-04-04

## 2024-04-04 RX ADMIN — AMINOPHYLLINE 125 MG: 25 INJECTION, SOLUTION INTRAVENOUS at 09:58

## 2024-04-04 RX ADMIN — TETRAKIS(2-METHOXYISOBUTYLISOCYANIDE)COPPER(I) TETRAFLUOROBORATE 32.5 MILLICURIE: 1 INJECTION, POWDER, LYOPHILIZED, FOR SOLUTION INTRAVENOUS at 09:55

## 2024-04-04 RX ADMIN — REGADENOSON 0.4 MG: 0.08 INJECTION, SOLUTION INTRAVENOUS at 09:53

## 2024-04-04 RX ADMIN — TETRAKIS(2-METHOXYISOBUTYLISOCYANIDE)COPPER(I) TETRAFLUOROBORATE 9.37 MILLICURIE: 1 INJECTION, POWDER, LYOPHILIZED, FOR SOLUTION INTRAVENOUS at 08:15

## (undated) DEVICE — 3M™ TEGADERM™ TRANSPARENT FILM DRESSING FRAME STYLE, 1626W, 4 IN X 4-3/4 IN (10 CM X 12 CM), 50/CT 4CT/CASE: Brand: 3M™ TEGADERM™

## (undated) DEVICE — SKIN MARKER,REGULAR TIP WITH RULER AND LABELS: Brand: DEVON

## (undated) DEVICE — DRSG AQUACEL SURG 3.5 X 10IN -- CONVERT TO ITEM 370183

## (undated) DEVICE — MASK ANES INF SZ 2 PREM TAIL VLV INFL PRT UNSCENTED SGL PT

## (undated) DEVICE — GOWN,SIRUS,NONRNF,SETINSLV,2XL,18/CS: Brand: MEDLINE

## (undated) DEVICE — SOLUTION IRRIG 3000ML 0.9% SOD CHL FLX CONT 0797208] ICU MEDICAL INC]

## (undated) DEVICE — DUAL IRRIGATION ADAPTOR

## (undated) DEVICE — NDL PRT INJ NSAF BLNT 18GX1.5 --

## (undated) DEVICE — SMOKE EVACUATION PENCIL: Brand: VALLEYLAB

## (undated) DEVICE — SNARE ENDOSCP DIA9MM SHTH DIA2.4MM CLD FOR POLYP EXACTO

## (undated) DEVICE — REM POLYHESIVE ADULT PATIENT RETURN ELECTRODE: Brand: VALLEYLAB

## (undated) DEVICE — 3M™ STERI-DRAPE™ U-DRAPE 1015: Brand: STERI-DRAPE™

## (undated) DEVICE — SUTURE VCRL + SZ 1-0 L36IN ABSRB UD CTX 1/2 CIR TAPR PNT VCP977H

## (undated) DEVICE — KIT DRP FOR RIO ROBOTIC ARM ASST SYS

## (undated) DEVICE — HANDPIECE SET WITH COAXIAL HIGH FLOW TIP AND SUCTION TUBE: Brand: INTERPULSE

## (undated) DEVICE — TELFA NON-ADHERENT PADS PREPAK: Brand: TELFA

## (undated) DEVICE — STERILE POLYISOPRENE POWDER-FREE SURGICAL GLOVES WITH EMOLLIENT COATING: Brand: PROTEXIS

## (undated) DEVICE — Device

## (undated) DEVICE — GAUZE SPONGES,12 PLY: Brand: CURITY

## (undated) DEVICE — PIN FIX 4X170MM STRL -- 2/PK MAKO

## (undated) DEVICE — SYR LR LCK 1ML GRAD NSAF 30ML --

## (undated) DEVICE — STERILE POLYISOPRENE POWDER-FREE SURGICAL GLOVES: Brand: PROTEXIS

## (undated) DEVICE — SUTURE STRATAFIX SYMMETRIC SZ 1 L18IN ABSRB VLT CT1 L36CM SXPP1A404

## (undated) DEVICE — 4-PORT MANIFOLD: Brand: NEPTUNE 2

## (undated) DEVICE — (D)PREP SKN CHLRAPRP APPL 26ML -- CONVERT TO ITEM 371833

## (undated) DEVICE — INFECTION CONTROL KIT SYS

## (undated) DEVICE — SUTURE VCRL SZ 2-0 L36IN ABSRB UD L36MM CT-1 1/2 CIR J945H

## (undated) DEVICE — APPLICATOR BNDG 1MM ADH PREMIERPRO EXOFIN

## (undated) DEVICE — CANNULA NASAL ADULT 10FT ETCO2/CO2 VENTFLO

## (undated) DEVICE — HOOD: Brand: FLYTE

## (undated) DEVICE — ENDOSCOPIC KIT 1.1+ 6 FT OP3 DE

## (undated) DEVICE — SHEET, DRAPE, SPLIT, STERILE: Brand: MEDLINE

## (undated) DEVICE — ELECTRODE PT RET AD L9FT HI MOIST COND ADH HYDRGEL CORDED

## (undated) DEVICE — TRAP SPEC POLYP REM STRNR CLN DSGN MAGNIFYING WIND DISP

## (undated) DEVICE — 3M™ IOBAN™ 2 ANTIMICROBIAL INCISE DRAPE 6650EZ: Brand: IOBAN™ 2

## (undated) DEVICE — MARKER RAD KNEE TIB CKPT STEREOTAXIC IMAG LESION LOC

## (undated) DEVICE — KIT TRK HIP PROC VIZADISC

## (undated) DEVICE — STRYKER PERFORMANCE SERIES SAGITTAL BLADE: Brand: STRYKER PERFORMANCE SERIES

## (undated) DEVICE — NEEDLE HYPO 18GA L1.5IN PNK S STL HUB POLYPR SHLD REG BVL

## (undated) DEVICE — MASK O2 MED AD 7 FT 3 IN 1 W/ STD CONN LTX

## (undated) DEVICE — Z DISCONTINUED LINER BOOT TRACTION NS LINDY LF

## (undated) DEVICE — CONTAINER,SPECIMEN,3OZ,OR STRL: Brand: MEDLINE

## (undated) DEVICE — 3M™ TEGADERM™ TRANSPARENT FILM DRESSING FRAME STYLE, 1624W, 2-3/8 IN X 2-3/4 IN (6 CM X 7 CM), 100/CT 4CT/CASE: Brand: 3M™ TEGADERM™

## (undated) DEVICE — LIGHT HANDLE: Brand: DEVON

## (undated) DEVICE — 6619 2 PTNT ISO SYS INCISE AREA&LT;(&GT;&&LT;)&GT;P: Brand: STERI-DRAPE™ IOBAN™ 2

## (undated) DEVICE — STERILE SLEEVE: Brand: CONVERTORS

## (undated) DEVICE — PREP KIT PEEL PTCH POVIDONE IOD

## (undated) DEVICE — DEVON™ KNEE AND BODY STRAP 60" X 3" (1.5 M X 7.6 CM): Brand: DEVON

## (undated) DEVICE — SUTURE MCRYL SZ 3-0 L27IN ABSRB UD L24MM PS-1 3/8 CIR PRIM Y936H